# Patient Record
Sex: MALE | Race: BLACK OR AFRICAN AMERICAN | NOT HISPANIC OR LATINO | Employment: FULL TIME | URBAN - METROPOLITAN AREA
[De-identification: names, ages, dates, MRNs, and addresses within clinical notes are randomized per-mention and may not be internally consistent; named-entity substitution may affect disease eponyms.]

---

## 2017-10-14 ENCOUNTER — HOSPITAL ENCOUNTER (EMERGENCY)
Facility: OTHER | Age: 27
Discharge: HOME | End: 2017-10-14
Attending: EMERGENCY MEDICINE
Payer: MEDICAID

## 2017-10-14 VITALS
OXYGEN SATURATION: 98 % | WEIGHT: 145 LBS | HEIGHT: 68 IN | TEMPERATURE: 99 F | DIASTOLIC BLOOD PRESSURE: 84 MMHG | RESPIRATION RATE: 18 BRPM | HEART RATE: 70 BPM | SYSTOLIC BLOOD PRESSURE: 125 MMHG | BODY MASS INDEX: 21.98 KG/M2

## 2017-10-14 DIAGNOSIS — N30.01 ACUTE CYSTITIS WITH HEMATURIA: Primary | ICD-10-CM

## 2017-10-14 DIAGNOSIS — D64.9 ANEMIA, UNSPECIFIED TYPE: ICD-10-CM

## 2017-10-14 LAB
ALBUMIN SERPL BCP-MCNC: 3.9 G/DL
ALP SERPL-CCNC: 94 U/L
ALT SERPL W/O P-5'-P-CCNC: 18 U/L
ANION GAP SERPL CALC-SCNC: 7 MMOL/L
AST SERPL-CCNC: 32 U/L
BACTERIA #/AREA URNS HPF: ABNORMAL /HPF
BASOPHILS # BLD AUTO: 0.02 K/UL
BASOPHILS NFR BLD: 0.2 %
BILIRUB SERPL-MCNC: 0.3 MG/DL
BILIRUB UR QL STRIP: NEGATIVE
BUN SERPL-MCNC: 13 MG/DL
CALCIUM SERPL-MCNC: 10 MG/DL
CHLORIDE SERPL-SCNC: 102 MMOL/L
CLARITY UR: CLEAR
CO2 SERPL-SCNC: 29 MMOL/L
COLOR UR: YELLOW
CREAT SERPL-MCNC: 1 MG/DL
DIFFERENTIAL METHOD: ABNORMAL
EOSINOPHIL # BLD AUTO: 0.1 K/UL
EOSINOPHIL NFR BLD: 1.1 %
ERYTHROCYTE [DISTWIDTH] IN BLOOD BY AUTOMATED COUNT: 14.1 %
EST. GFR  (AFRICAN AMERICAN): >60 ML/MIN/1.73 M^2
EST. GFR  (NON AFRICAN AMERICAN): >60 ML/MIN/1.73 M^2
GLUCOSE SERPL-MCNC: 94 MG/DL
GLUCOSE UR QL STRIP: NEGATIVE
HCT VFR BLD AUTO: 39.3 %
HGB BLD-MCNC: 12.8 G/DL
HGB UR QL STRIP: ABNORMAL
KETONES UR QL STRIP: NEGATIVE
LEUKOCYTE ESTERASE UR QL STRIP: ABNORMAL
LIPASE SERPL-CCNC: 8 U/L
LYMPHOCYTES # BLD AUTO: 1.8 K/UL
LYMPHOCYTES NFR BLD: 14.4 %
MCH RBC QN AUTO: 28.3 PG
MCHC RBC AUTO-ENTMCNC: 32.6 G/DL
MCV RBC AUTO: 87 FL
MICROSCOPIC COMMENT: ABNORMAL
MONOCYTES # BLD AUTO: 0.7 K/UL
MONOCYTES NFR BLD: 5.3 %
NEUTROPHILS # BLD AUTO: 9.7 K/UL
NEUTROPHILS NFR BLD: 78.8 %
NITRITE UR QL STRIP: NEGATIVE
PH UR STRIP: 7 [PH] (ref 5–8)
PLATELET # BLD AUTO: 236 K/UL
PMV BLD AUTO: 10 FL
POTASSIUM SERPL-SCNC: 4.6 MMOL/L
PROT SERPL-MCNC: 8.1 G/DL
PROT UR QL STRIP: NEGATIVE
RBC # BLD AUTO: 4.52 M/UL
RBC #/AREA URNS HPF: 0 /HPF (ref 0–4)
SODIUM SERPL-SCNC: 138 MMOL/L
SP GR UR STRIP: 1.01 (ref 1–1.03)
URN SPEC COLLECT METH UR: ABNORMAL
UROBILINOGEN UR STRIP-ACNC: NEGATIVE EU/DL
WBC # BLD AUTO: 12.25 K/UL
WBC #/AREA URNS HPF: 15 /HPF (ref 0–5)
WBC CLUMPS URNS QL MICRO: ABNORMAL

## 2017-10-14 PROCEDURE — 63600175 PHARM REV CODE 636 W HCPCS: Performed by: EMERGENCY MEDICINE

## 2017-10-14 PROCEDURE — 25000003 PHARM REV CODE 250: Performed by: EMERGENCY MEDICINE

## 2017-10-14 PROCEDURE — 96374 THER/PROPH/DIAG INJ IV PUSH: CPT

## 2017-10-14 PROCEDURE — 25500020 PHARM REV CODE 255: Performed by: EMERGENCY MEDICINE

## 2017-10-14 PROCEDURE — 87591 N.GONORRHOEAE DNA AMP PROB: CPT

## 2017-10-14 PROCEDURE — 99284 EMERGENCY DEPT VISIT MOD MDM: CPT | Mod: 25

## 2017-10-14 PROCEDURE — 96372 THER/PROPH/DIAG INJ SC/IM: CPT

## 2017-10-14 PROCEDURE — 83690 ASSAY OF LIPASE: CPT

## 2017-10-14 PROCEDURE — 81000 URINALYSIS NONAUTO W/SCOPE: CPT

## 2017-10-14 PROCEDURE — 85025 COMPLETE CBC W/AUTO DIFF WBC: CPT

## 2017-10-14 PROCEDURE — 80053 COMPREHEN METABOLIC PANEL: CPT

## 2017-10-14 RX ORDER — CEFTRIAXONE 250 MG/1
250 INJECTION, POWDER, FOR SOLUTION INTRAMUSCULAR; INTRAVENOUS
Status: COMPLETED | OUTPATIENT
Start: 2017-10-14 | End: 2017-10-14

## 2017-10-14 RX ORDER — AZITHROMYCIN 250 MG/1
1000 TABLET, FILM COATED ORAL
Status: COMPLETED | OUTPATIENT
Start: 2017-10-14 | End: 2017-10-14

## 2017-10-14 RX ORDER — MORPHINE SULFATE 2 MG/ML
2 INJECTION, SOLUTION INTRAMUSCULAR; INTRAVENOUS
Status: COMPLETED | OUTPATIENT
Start: 2017-10-14 | End: 2017-10-14

## 2017-10-14 RX ORDER — AZITHROMYCIN 250 MG/1
1000 TABLET, FILM COATED ORAL
Status: DISCONTINUED | OUTPATIENT
Start: 2017-10-14 | End: 2017-10-14 | Stop reason: SDUPTHER

## 2017-10-14 RX ORDER — AZITHROMYCIN 100 MG/ML
1000 INJECTION, POWDER, LYOPHILIZED, FOR SOLUTION INTRAVENOUS
Status: DISCONTINUED | OUTPATIENT
Start: 2017-10-14 | End: 2017-10-14

## 2017-10-14 RX ADMIN — CEFTRIAXONE SODIUM 250 MG: 250 INJECTION, POWDER, FOR SOLUTION INTRAMUSCULAR; INTRAVENOUS at 11:10

## 2017-10-14 RX ADMIN — IOHEXOL 15 ML: 350 INJECTION, SOLUTION INTRAVENOUS at 08:10

## 2017-10-14 RX ADMIN — MORPHINE SULFATE 2 MG: 2 INJECTION, SOLUTION INTRAMUSCULAR; INTRAVENOUS at 08:10

## 2017-10-14 RX ADMIN — AZITHROMYCIN 1000 MG: 250 TABLET, FILM COATED ORAL at 11:10

## 2017-10-14 RX ADMIN — IOHEXOL 75 ML: 350 INJECTION, SOLUTION INTRAVENOUS at 09:10

## 2017-10-14 NOTE — ED NOTES
"Pt sleeping on bed bed w/ no active distress noted. Pt awakens easily to verbal stimuli. Pr denies active abdominal pains, cramping or discomfort at this time. Stating," It just comes and goes, it just eased up on me". Pt denies pains or needs at this time. Aware of current plan of care, awaiting results. Will continue to monitor.   "

## 2017-10-14 NOTE — ED TRIAGE NOTES
Pt presents to ED with c/o bilateral lower abdominal pain that started at 0200 this morning. Pt denies urinary symptoms, denies n/v/d. Pt AAO x4.

## 2017-10-14 NOTE — ED PROVIDER NOTES
"Encounter Date: 10/14/2017    SCRIBE #1 NOTE: I, Ga Spear, am scribing for, and in the presence of, Dr. Strange.       History     Chief Complaint   Patient presents with    Abdominal Pain     x 1 hr to the Q, no N/V/D, no dysuria     7:40 AM    Patient is a 27 y.o. male who presents to the ED with complaint of lower abdominal pain, which began about six hours ago. Pain is intermittent and is described as "cramping-aching." He denies constipation, diarrhea, nausea, vomiting, fever, chills, or penile discharge. He reports a normal BM since arriving to the ED. He has never had similar pain in the past. He reports no major medical problems, daily medications, or known allergies. He admits to use of tobacco, but denies use of alcohol or illicit drugs.        The history is provided by the patient.     Review of patient's allergies indicates:  No Known Allergies  Past Medical History:   Diagnosis Date    Asthma      History reviewed. No pertinent surgical history.  History reviewed. No pertinent family history.  Social History   Substance Use Topics    Smoking status: Current Every Day Smoker     Packs/day: 0.50     Types: Cigarettes    Smokeless tobacco: Never Used    Alcohol use No     Review of Systems   Constitutional: Negative for chills and fever.   HENT: Negative for sore throat.    Respiratory: Negative for shortness of breath.    Cardiovascular: Negative for chest pain.   Gastrointestinal: Positive for abdominal pain (lower). Negative for constipation, diarrhea, nausea and vomiting.   Genitourinary: Negative for discharge and dysuria.   Musculoskeletal: Negative for back pain.   Skin: Negative for rash.   Neurological: Negative for weakness.   Hematological: Does not bruise/bleed easily.       Physical Exam     Initial Vitals [10/14/17 0243]   BP Pulse Resp Temp SpO2   (!) 143/79 90 20 99 °F (37.2 °C) 99 %      MAP       100.33         Physical Exam    Nursing note and vitals " reviewed.  Constitutional: He appears well-developed and well-nourished. He is not diaphoretic. No distress.   HENT:   Head: Normocephalic and atraumatic.   Mouth/Throat: Oropharynx is clear and moist.   Eyes: Conjunctivae and EOM are normal. Pupils are equal, round, and reactive to light.   Conjunctiva are pink, clear, and intact.   Neck: Normal range of motion. Neck supple.   Cardiovascular: Normal rate, regular rhythm and normal heart sounds.   Normal S1, S2. No murmurs, no rubs, no gallops.    Pulmonary/Chest: Breath sounds normal. No respiratory distress. He has no wheezes. He has no rhonchi. He has no rales.   Clear to ascultation bilaterally.   Abdominal: Soft. There is tenderness (Periumbilical to RLQ tenderness to palpation).   No flank tenderness.   Musculoskeletal: Normal range of motion. He exhibits no edema.   Neurological: He is alert and oriented to person, place, and time. He has normal strength. No cranial nerve deficit.   Skin:   Warm and dry. No rashes, no lesions, or skin tenting.   Psychiatric: He has a normal mood and affect. His behavior is normal.         ED Course   Procedures  Labs Reviewed   CBC W/ AUTO DIFFERENTIAL - Abnormal; Notable for the following:        Result Value    RBC 4.52 (*)     Hemoglobin 12.8 (*)     Hematocrit 39.3 (*)     Gran # 9.7 (*)     Gran% 78.8 (*)     Lymph% 14.4 (*)     All other components within normal limits   COMPREHENSIVE METABOLIC PANEL - Abnormal; Notable for the following:     Anion Gap 7 (*)     All other components within normal limits   URINALYSIS - Abnormal; Notable for the following:     Occult Blood UA 1+ (*)     Leukocytes, UA 1+ (*)     All other components within normal limits   URINALYSIS MICROSCOPIC - Abnormal; Notable for the following:     WBC, UA 15 (*)     Bacteria, UA Many (*)     All other components within normal limits   C. TRACHOMATIS/N. GONORRHOEAE BY AMP DNA   C. TRACHOMATIS/N. GONORRHOEAE BY AMP DNA   LIPASE     Imaging Results           CT Abdomen Pelvis With Contrast (Final result)  Result time 10/14/17 09:58:31    Final result by Miguel A Baker MD (10/14/17 09:58:31)                 Impression:     No acute findings.      Electronically signed by: MIGUEL A BAKER MD  Date:     10/14/17  Time:    09:58              Narrative:    CT abdomen pelvis with contrast.    Findings: 75 mL of Omnipaque 350 IV contrast was administered for today's examination.    The visualized portion the base of the lungs, visualized portion of the heart, stomach, spleen, pancreas, liver, gallbladder, adrenal glands, visualized portion of the aorta, and kidneys have a normal appearance. The bladder is unremarkable. The bowel is unremarkable. The appendix has a normal appearance. The osseous structures are unremarkable.                                      Medical Decision Making:   Clinical Tests:   Lab Tests: Ordered and Reviewed  Radiological Study: Ordered and Reviewed            Scribe Attestation:   Scribe #1: I performed the above scribed service and the documentation accurately describes the services I performed. I attest to the accuracy of the note.    Attending Attestation:             Attending ED Notes:   Emergent evaluation a 27-year-old male with suprapubic abdominal pain.  No penile discharge.  No flank pain.  Patient is afebrile, nontoxic-appearing with stable vital signs.  No elevation of white blood cell count.  H&H 12.8 and 39.3.  No acute findings on CMP.  Lipase is 8.  Urinary analysis reveals many bacteria, 1+ leukocytes and 1+ blood.  No acute findings on CT scan.  The patient is extensively counseled on his diagnosis and treatment including all diagnostic, laboratory and physical exam findings.  The patient discharged good condition and directed follow-up with his PCP and urology in the next 24-48 hours.  I, Dr. Jed Putnam, personally performed the services described in this documentation. All medical record entries made by the scribe were at my  direction and in my presence.  I have reviewed the chart and agree that the record reflects my personal performance and is accurate and complete. Jed Cast MD.  6:33 PM 10/14/2017        ED Course      Clinical Impression:     1. Acute cystitis with hematuria    2. Anemia, unspecified type                                 Jed Cast MD  10/14/17 1830

## 2017-10-15 LAB
C TRACH DNA SPEC QL NAA+PROBE: NOT DETECTED
N GONORRHOEA DNA SPEC QL NAA+PROBE: NOT DETECTED

## 2018-04-19 ENCOUNTER — HOSPITAL ENCOUNTER (EMERGENCY)
Facility: OTHER | Age: 28
Discharge: HOME | End: 2018-04-20
Attending: EMERGENCY MEDICINE
Payer: MEDICAID

## 2018-04-19 DIAGNOSIS — S20.211A CONTUSION OF RIGHT CHEST WALL, INITIAL ENCOUNTER: ICD-10-CM
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
## 2018-04-19 DIAGNOSIS — S16.1XXA STRAIN OF NECK MUSCLE, INITIAL ENCOUNTER: ICD-10-CM

## 2018-04-19 DIAGNOSIS — R07.89 CHEST WALL PAIN: ICD-10-CM

## 2018-04-19 DIAGNOSIS — R07.9 CHEST PAIN: ICD-10-CM

## 2018-04-19 PROCEDURE — 25000003 PHARM REV CODE 250: Performed by: PHYSICIAN ASSISTANT

## 2018-04-19 PROCEDURE — 99284 EMERGENCY DEPT VISIT MOD MDM: CPT | Mod: 25

## 2018-04-19 PROCEDURE — 93010 ELECTROCARDIOGRAM REPORT: CPT | Mod: ,,, | Performed by: INTERNAL MEDICINE

## 2018-04-19 PROCEDURE — 93005 ELECTROCARDIOGRAM TRACING: CPT

## 2018-04-19 RX ORDER — IBUPROFEN 600 MG/1
600 TABLET ORAL
Status: COMPLETED | OUTPATIENT
Start: 2018-04-19 | End: 2018-04-19

## 2018-04-19 RX ADMIN — IBUPROFEN 600 MG: 600 TABLET, FILM COATED ORAL at 11:04

## 2018-04-20 VITALS
WEIGHT: 158.94 LBS | BODY MASS INDEX: 24.09 KG/M2 | RESPIRATION RATE: 16 BRPM | TEMPERATURE: 97 F | DIASTOLIC BLOOD PRESSURE: 88 MMHG | OXYGEN SATURATION: 100 % | HEIGHT: 68 IN | HEART RATE: 75 BPM | SYSTOLIC BLOOD PRESSURE: 168 MMHG

## 2018-04-20 RX ORDER — IBUPROFEN 600 MG/1
600 TABLET ORAL EVERY 6 HOURS PRN
Qty: 20 TABLET | Refills: 0 | OUTPATIENT
Start: 2018-04-20 | End: 2019-02-25
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
## 2019-02-25 ENCOUNTER — HOSPITAL ENCOUNTER (EMERGENCY)
Facility: OTHER | Age: 29
Discharge: HOME | End: 2019-02-25
Attending: EMERGENCY MEDICINE
Payer: MEDICAID

## 2019-02-25 VITALS
HEIGHT: 68 IN | BODY MASS INDEX: 26.07 KG/M2 | SYSTOLIC BLOOD PRESSURE: 172 MMHG | TEMPERATURE: 98 F | OXYGEN SATURATION: 99 % | DIASTOLIC BLOOD PRESSURE: 104 MMHG | HEART RATE: 75 BPM | WEIGHT: 172 LBS | RESPIRATION RATE: 17 BRPM

## 2019-02-25 DIAGNOSIS — S10.91XA ABRASION OF NECK, INITIAL ENCOUNTER: ICD-10-CM

## 2019-02-25 DIAGNOSIS — M62.838 CERVICAL PARASPINAL MUSCLE SPASM: Primary | ICD-10-CM

## 2019-02-25 DIAGNOSIS — S40.812A ABRASION OF LEFT UPPER EXTREMITY, INITIAL ENCOUNTER: ICD-10-CM

## 2019-02-25 DIAGNOSIS — S09.90XA MINOR HEAD INJURY WITHOUT LOSS OF CONSCIOUSNESS, INITIAL ENCOUNTER: ICD-10-CM

## 2019-02-25 PROCEDURE — 99284 EMERGENCY DEPT VISIT MOD MDM: CPT | Mod: 25

## 2019-02-25 PROCEDURE — 63600175 PHARM REV CODE 636 W HCPCS: Performed by: EMERGENCY MEDICINE

## 2019-02-25 PROCEDURE — 96372 THER/PROPH/DIAG INJ SC/IM: CPT

## 2019-02-25 RX ORDER — KETOROLAC TROMETHAMINE 30 MG/ML
30 INJECTION, SOLUTION INTRAMUSCULAR; INTRAVENOUS
Status: COMPLETED | OUTPATIENT
Start: 2019-02-25 | End: 2019-02-25

## 2019-02-25 RX ORDER — IBUPROFEN 600 MG/1
600 TABLET ORAL EVERY 6 HOURS PRN
Qty: 20 TABLET | Refills: 0 | Status: SHIPPED | OUTPATIENT
Start: 2019-02-25 | End: 2019-07-02 | Stop reason: ALTCHOICE

## 2019-02-25 RX ORDER — CYCLOBENZAPRINE HCL 10 MG
10 TABLET ORAL 3 TIMES DAILY PRN
Qty: 15 TABLET | Refills: 0 | Status: SHIPPED | OUTPATIENT
Start: 2019-02-25 | End: 2019-03-02

## 2019-02-25 RX ADMIN — KETOROLAC TROMETHAMINE 30 MG: 30 INJECTION, SOLUTION INTRAMUSCULAR; INTRAVENOUS at 02:02

## 2019-02-25 NOTE — ED PROVIDER NOTES
Encounter Date: 2/25/2019    SCRIBE #1 NOTE: I, Ignacio Martines, am scribing for, and in the presence of, Dr. Robertson .       History     Chief Complaint   Patient presents with    Neck Pain     Pt reports neck pain and left arm pain after a part of the ceiling collapsed on him while at work. No LOC     Time seen by provider: 2:13 PM    This is a 28 y.o. male who presents with complaint of posterior neck pain (R>L) s/p injury that occurred yesterday. Patient states he was standing and urinating in the bathroom when a ceiling tile collapsed and landed on his posterior neck. He denies head trauma, LOC, or back pain. He has applied a bag of ice and taken a hot shower with no pain relief. He has not been experiencing headaches, dizziness, vision changes, or numbness or tingling in the extremities. He denies significant past medical or surgical history. He has no additional complaints.       The history is provided by the patient.     Review of patient's allergies indicates:  No Known Allergies  Past Medical History:   Diagnosis Date    Anxiety     Asthma     Depression      No past surgical history on file.  No family history on file.  Social History     Tobacco Use    Smoking status: Current Every Day Smoker     Packs/day: 0.50     Types: Cigarettes    Smokeless tobacco: Never Used   Substance Use Topics    Alcohol use: No    Drug use: Yes     Types: Marijuana     Review of Systems   Constitutional: Negative for chills and fever.   HENT: Negative for congestion, rhinorrhea and sore throat.    Eyes: Negative for visual disturbance.   Respiratory: Negative for cough and shortness of breath.    Cardiovascular: Negative for chest pain.   Gastrointestinal: Negative for abdominal pain, diarrhea, nausea and vomiting.   Genitourinary: Negative for dysuria, frequency and urgency.   Musculoskeletal: Positive for neck pain (posterior (R>L)). Negative for back pain.   Skin: Negative for rash.   Neurological: Negative for  dizziness, syncope, numbness and headaches.        Negative for tingling.    Psychiatric/Behavioral: Negative for confusion.       Physical Exam     Initial Vitals [02/25/19 1339]   BP Pulse Resp Temp SpO2   (!) 152/86 82 18 97.5 °F (36.4 °C) 100 %      MAP       --         Physical Exam    Nursing note and vitals reviewed.  Constitutional: He appears well-developed and well-nourished. No distress.   HENT:   Head: Normocephalic and atraumatic.   Eyes: Conjunctivae and EOM are normal. Pupils are equal, round, and reactive to light.   Neck: Normal range of motion. Neck supple.   Paraspinal C spine tenderness bilaterally, worse on right. No focal midline tenderness.    Cardiovascular: Normal rate, regular rhythm, normal heart sounds and intact distal pulses.   Pulmonary/Chest: Breath sounds normal. No respiratory distress. He has no wheezes. He has no rhonchi. He has no rales.   Abdominal: Soft. He exhibits no distension. There is no tenderness.   Musculoskeletal: Normal range of motion.   Neurological: He is alert and oriented to person, place, and time. He has normal strength. No cranial nerve deficit.   Skin: Skin is warm and dry.   Superficial abrasion on left forearm. Superficial abrasion on posterior neck.    Psychiatric: He has a normal mood and affect. His behavior is normal. Judgment and thought content normal.         ED Course   Procedures  Labs Reviewed - No data to display       Imaging Results    None          Medical Decision Making:   Initial Assessment:   27 y/o M with trauma of posterior neck and forearm following ceiling tile falling yesterday.  Very low suspicion of bony injury given mechanism and exam.  Tiles struck the top of a bathroom stall prior to striking the patient.  Mild superficial abrasions noted and paraspinal muscle spasm and tenderness.  Will tx with nsaids, muscle relaxer, advise heat and rest.             Scribe Attestation:   Scribe #1: I performed the above scribed service and the  documentation accurately describes the services I performed. I attest to the accuracy of the note.    Attending Attestation:           Physician Attestation for Scribe:  Physician Attestation Statement for Scribe #1: I, Dr. Robertson, reviewed documentation, as scribed by Ignacio Martines  in my presence, and it is both accurate and complete.                    Clinical Impression:     1. Cervical paraspinal muscle spasm    2. Abrasion of neck, initial encounter    3. Abrasion of left upper extremity, initial encounter    4. Minor head injury without loss of consciousness, initial encounter                                   Sulema Robertson MD  02/26/19 0737

## 2019-02-25 NOTE — ED TRIAGE NOTES
Pt reports that while urinating yesterday ceiling tiles fell on him. Denies any LOC reports pain was worse this morning with pain radiating down both sides of neck and into back. Superficial abrasions to L FA.

## 2019-07-02 ENCOUNTER — HOSPITAL ENCOUNTER (EMERGENCY)
Facility: OTHER | Age: 29
Discharge: HOME | End: 2019-07-02
Attending: EMERGENCY MEDICINE
Payer: MEDICAID

## 2019-07-02 VITALS
WEIGHT: 145 LBS | OXYGEN SATURATION: 100 % | BODY MASS INDEX: 21.98 KG/M2 | HEART RATE: 102 BPM | HEIGHT: 68 IN | RESPIRATION RATE: 18 BRPM | DIASTOLIC BLOOD PRESSURE: 89 MMHG | TEMPERATURE: 98 F | SYSTOLIC BLOOD PRESSURE: 152 MMHG

## 2019-07-02 DIAGNOSIS — K04.7 CHRONIC DENTAL INFECTION: ICD-10-CM

## 2019-07-02 DIAGNOSIS — K02.9 DENTAL CARIES: ICD-10-CM

## 2019-07-02 DIAGNOSIS — L03.211 FACIAL CELLULITIS: Primary | ICD-10-CM

## 2019-07-02 LAB
ANION GAP SERPL CALC-SCNC: 11 MMOL/L (ref 8–16)
BASOPHILS # BLD AUTO: 0.01 K/UL (ref 0–0.2)
BASOPHILS NFR BLD: 0.1 % (ref 0–1.9)
BUN SERPL-MCNC: 12 MG/DL (ref 6–20)
CALCIUM SERPL-MCNC: 10.9 MG/DL (ref 8.7–10.5)
CHLORIDE SERPL-SCNC: 97 MMOL/L (ref 95–110)
CO2 SERPL-SCNC: 30 MMOL/L (ref 23–29)
CREAT SERPL-MCNC: 1.1 MG/DL (ref 0.5–1.4)
DIFFERENTIAL METHOD: ABNORMAL
EOSINOPHIL # BLD AUTO: 0 K/UL (ref 0–0.5)
EOSINOPHIL NFR BLD: 0.4 % (ref 0–8)
ERYTHROCYTE [DISTWIDTH] IN BLOOD BY AUTOMATED COUNT: 14.4 % (ref 11.5–14.5)
EST. GFR  (AFRICAN AMERICAN): >60 ML/MIN/1.73 M^2
EST. GFR  (NON AFRICAN AMERICAN): >60 ML/MIN/1.73 M^2
GLUCOSE SERPL-MCNC: 72 MG/DL (ref 70–110)
HCT VFR BLD AUTO: 43.9 % (ref 40–54)
HGB BLD-MCNC: 14.5 G/DL (ref 14–18)
LYMPHOCYTES # BLD AUTO: 2 K/UL (ref 1–4.8)
LYMPHOCYTES NFR BLD: 17.9 % (ref 18–48)
MCH RBC QN AUTO: 29.8 PG (ref 27–31)
MCHC RBC AUTO-ENTMCNC: 33 G/DL (ref 32–36)
MCV RBC AUTO: 90 FL (ref 82–98)
MONOCYTES # BLD AUTO: 1 K/UL (ref 0.3–1)
MONOCYTES NFR BLD: 8.8 % (ref 4–15)
NEUTROPHILS # BLD AUTO: 7.9 K/UL (ref 1.8–7.7)
NEUTROPHILS NFR BLD: 72.8 % (ref 38–73)
PLATELET # BLD AUTO: 211 K/UL (ref 150–350)
PMV BLD AUTO: 10 FL (ref 9.2–12.9)
POTASSIUM SERPL-SCNC: 4.5 MMOL/L (ref 3.5–5.1)
RBC # BLD AUTO: 4.87 M/UL (ref 4.6–6.2)
SODIUM SERPL-SCNC: 138 MMOL/L (ref 136–145)
WBC # BLD AUTO: 10.93 K/UL (ref 3.9–12.7)

## 2019-07-02 PROCEDURE — 99285 EMERGENCY DEPT VISIT HI MDM: CPT

## 2019-07-02 PROCEDURE — 85025 COMPLETE CBC W/AUTO DIFF WBC: CPT

## 2019-07-02 PROCEDURE — 25000003 PHARM REV CODE 250: Performed by: PHYSICIAN ASSISTANT

## 2019-07-02 PROCEDURE — 25500020 PHARM REV CODE 255: Performed by: EMERGENCY MEDICINE

## 2019-07-02 PROCEDURE — 80048 BASIC METABOLIC PNL TOTAL CA: CPT

## 2019-07-02 PROCEDURE — 96374 THER/PROPH/DIAG INJ IV PUSH: CPT

## 2019-07-02 PROCEDURE — 63600175 PHARM REV CODE 636 W HCPCS: Performed by: PHYSICIAN ASSISTANT

## 2019-07-02 RX ORDER — AMOXICILLIN AND CLAVULANATE POTASSIUM 875; 125 MG/1; MG/1
1 TABLET, FILM COATED ORAL 2 TIMES DAILY
Qty: 20 TABLET | Refills: 0 | Status: SHIPPED | OUTPATIENT
Start: 2019-07-02 | End: 2019-07-12

## 2019-07-02 RX ORDER — KETOROLAC TROMETHAMINE 30 MG/ML
15 INJECTION, SOLUTION INTRAMUSCULAR; INTRAVENOUS
Status: COMPLETED | OUTPATIENT
Start: 2019-07-02 | End: 2019-07-02

## 2019-07-02 RX ORDER — OXAPROZIN 600 MG/1
600 TABLET, FILM COATED ORAL 2 TIMES DAILY PRN
Qty: 20 TABLET | Refills: 0 | Status: SHIPPED | OUTPATIENT
Start: 2019-07-02 | End: 2023-05-10

## 2019-07-02 RX ORDER — AMOXICILLIN AND CLAVULANATE POTASSIUM 875; 125 MG/1; MG/1
1 TABLET, FILM COATED ORAL
Status: COMPLETED | OUTPATIENT
Start: 2019-07-02 | End: 2019-07-02

## 2019-07-02 RX ADMIN — KETOROLAC TROMETHAMINE 15 MG: 30 INJECTION, SOLUTION INTRAMUSCULAR; INTRAVENOUS at 10:07

## 2019-07-02 RX ADMIN — IOHEXOL 75 ML: 350 INJECTION, SOLUTION INTRAVENOUS at 12:07

## 2019-07-02 RX ADMIN — AMOXICILLIN AND CLAVULANATE POTASSIUM 1 TABLET: 875; 125 TABLET, FILM COATED ORAL at 01:07

## 2019-07-02 NOTE — ED PROVIDER NOTES
Encounter Date: 7/2/2019       History     Chief Complaint   Patient presents with    Facial Swelling     Pt c/o right sided face swelling, broken right upper tooth & subjective fever.     28-year-old male with history of asthma, anxiety and depression presents to the emergency department with complaints of right-sided facial swelling and dental pain. He also reports subjective fever.  He states that he has been having the pain for quite some time however noticed the swelling last night.  He states that he took ibuprofen last night for his fever.  He denies any nausea or vomiting.  He reports pain at a 10/10.    The history is provided by the patient.     Review of patient's allergies indicates:  No Known Allergies  Past Medical History:   Diagnosis Date    Anxiety     Asthma     Depression      History reviewed. No pertinent surgical history.  History reviewed. No pertinent family history.  Social History     Tobacco Use    Smoking status: Current Every Day Smoker     Packs/day: 0.50     Types: Cigarettes    Smokeless tobacco: Never Used   Substance Use Topics    Alcohol use: No    Drug use: Yes     Types: Marijuana     Review of Systems   Constitutional: Positive for fever.   HENT: Positive for dental problem and facial swelling. Negative for sore throat and trouble swallowing.    Respiratory: Negative for shortness of breath.    Cardiovascular: Negative for chest pain.   Gastrointestinal: Negative for nausea and vomiting.   Musculoskeletal: Negative for back pain.   Skin: Negative for rash.   Neurological: Negative for weakness.   Hematological: Does not bruise/bleed easily.       Physical Exam     Initial Vitals [07/02/19 1015]   BP Pulse Resp Temp SpO2   (!) 133/92 102 18 98.7 °F (37.1 °C) 99 %      MAP       --         Physical Exam    Nursing note and vitals reviewed.  Constitutional: He appears well-developed and well-nourished. He is not diaphoretic.  Non-toxic appearance. No distress.   HENT:    Head: Normocephalic and atraumatic.       Right Ear: External ear normal.   Left Ear: External ear normal.   Nose: Nose normal.   Mouth/Throat: Uvula is midline, oropharynx is clear and moist and mucous membranes are normal. Mucous membranes are not pale and not dry. No trismus in the jaw. Abnormal dentition. Dental caries present. No uvula swelling. No oropharyngeal exudate, posterior oropharyngeal edema, posterior oropharyngeal erythema or tonsillar abscesses.   Multiple decayed teeth with poor dentition.  Gingival inflammation without palpable drainable abscess.  Concerns for underlying dental abscess.   Eyes: Conjunctivae, EOM and lids are normal. Pupils are equal, round, and reactive to light. No scleral icterus.   Neck: Normal range of motion and phonation normal. Neck supple.   Cardiovascular: Normal rate, regular rhythm and normal heart sounds. Exam reveals no gallop and no friction rub.    No murmur heard.  Pulmonary/Chest: Effort normal and breath sounds normal. No respiratory distress. He has no decreased breath sounds. He has no wheezes. He has no rhonchi. He has no rales.   Abdominal: Normal appearance.   Musculoskeletal: Normal range of motion.   No obvious deformities, moving all extremities, normal gait   Neurological: He is alert and oriented to person, place, and time. No sensory deficit.   Skin: Skin is warm, dry and intact. No lesion and no rash noted. No erythema.   Psychiatric: He has a normal mood and affect. His speech is normal and behavior is normal. Judgment normal. Cognition and memory are normal.         ED Course   Procedures  Labs Reviewed   CBC W/ AUTO DIFFERENTIAL - Abnormal; Notable for the following components:       Result Value    Gran # (ANC) 7.9 (*)     Lymph% 17.9 (*)     All other components within normal limits   BASIC METABOLIC PANEL - Abnormal; Notable for the following components:    CO2 30 (*)     Calcium 10.9 (*)     All other components within normal limits           Imaging Results          CT Maxillofacial With Contrast (Final result)  Result time 07/02/19 13:20:39    Final result by Nikko Vivas III, MD (07/02/19 13:20:39)                 Impression:      Right facial cellulitis with no evidence of abscess.      Electronically signed by: Nikko Vivas MD  Date:    07/02/2019  Time:    13:20             Narrative:    EXAMINATION:  CT MAXILLOFACIAL WITH CONTRAST    CLINICAL HISTORY:  Mass, lump or swelling, maxface;facial swelling r/o abscess;    FINDINGS:  Patient was administered 75 cc of Omnipaque 350.  There is soft tissue swelling of the face right greater than left.  Orbits are unremarkable.  Intracranial structures show nothing unusual.  Preseptal space is unremarkable.  Swelling primarily involves the right side of the face below the orbit and is consistent with cellulitis right greater than left.  No definite abscess collection is seen at this time.  There is very mild right maxillary sinusitis change.  Osseous structures show nothing unusual.                                 Medical Decision Making:   History:   Old Medical Records: I decided to obtain old medical records.  Initial Assessment:   28-year-old male with complaints consistent facial cellulitis and dental caries concerning for underlying dental infection.  Vital signs stable, afebrile, neurovascularly intact.  He is alert, healthy and nontoxic appearing.  He is not distressed.  On exam he has facial swelling with dental pain and multiple decayed teeth.  No drainable abscess however there is some gingival inflammation concerning for underlying abscess.  Clinical Tests:   Lab Tests: Reviewed and Ordered  Radiological Study: Ordered and Reviewed  ED Management:  Basic labs as well as CT of the face with IV contrast was obtained. No elevation in white blood cell count H&H stable. Patient has facial cellulitis seen on CT without evidence of abscess.  He was administered Toradol and Augmentin in the  emergency department will be discharged home with Augmentin oxaprozin as well as care instructions.  He is urged to follow up with dental clinic at 1st available appointment or return to the emergency department for any worsening signs or symptoms. He states understanding agrees with this plan.  This is the extent of patient's complaints today.  This patient was discussed with the attending physician who agrees with treatment plan.  Other:   I have discussed this case with another health care provider.       <> Summary of the Discussion: Steven  This note was created using [employer] dictation.  There may be typographical errors secondary to dictation.                        Clinical Impression:     1. Facial cellulitis    2. Dental caries    3. Chronic dental infection            Disposition:   Disposition: Discharged  Condition: Stable                        Jessica Dunn PA-C  07/02/19 0194

## 2022-04-11 ENCOUNTER — HOSPITAL ENCOUNTER (EMERGENCY)
Facility: OTHER | Age: 32
Discharge: HOME | End: 2022-04-11
Attending: EMERGENCY MEDICINE
Payer: MEDICAID

## 2022-04-11 VITALS
HEIGHT: 67 IN | WEIGHT: 154 LBS | SYSTOLIC BLOOD PRESSURE: 157 MMHG | OXYGEN SATURATION: 100 % | RESPIRATION RATE: 16 BRPM | BODY MASS INDEX: 24.17 KG/M2 | TEMPERATURE: 98 F | DIASTOLIC BLOOD PRESSURE: 108 MMHG | HEART RATE: 79 BPM

## 2022-04-11 DIAGNOSIS — S09.93XA DENTAL TRAUMA, INITIAL ENCOUNTER: ICD-10-CM

## 2022-04-11 DIAGNOSIS — S00.83XA FACIAL CONTUSION, INITIAL ENCOUNTER: Primary | ICD-10-CM
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
## 2022-04-11 PROCEDURE — 99284 EMERGENCY DEPT VISIT MOD MDM: CPT | Mod: 25

## 2022-04-11 PROCEDURE — 25000003 PHARM REV CODE 250: Performed by: EMERGENCY MEDICINE

## 2022-04-11 RX ORDER — HYDROCODONE BITARTRATE AND ACETAMINOPHEN 5; 325 MG/1; MG/1
1 TABLET ORAL EVERY 4 HOURS PRN
Qty: 12 TABLET | Refills: 0 | OUTPATIENT
Start: 2022-04-11 | End: 2022-08-07

## 2022-04-11 RX ORDER — ONDANSETRON 4 MG/1
4 TABLET, ORALLY DISINTEGRATING ORAL EVERY 8 HOURS PRN
Qty: 30 TABLET | Refills: 0 | OUTPATIENT
Start: 2022-04-11 | End: 2022-07-25

## 2022-04-11 RX ORDER — IBUPROFEN 600 MG/1
600 TABLET ORAL EVERY 6 HOURS PRN
Qty: 20 TABLET | Refills: 0 | OUTPATIENT
Start: 2022-04-11 | End: 2022-08-07

## 2022-04-11 RX ORDER — IBUPROFEN 600 MG/1
600 TABLET ORAL
Status: COMPLETED | OUTPATIENT
Start: 2022-04-11 | End: 2022-04-11

## 2022-04-11 RX ORDER — HYDROCODONE BITARTRATE AND ACETAMINOPHEN 5; 325 MG/1; MG/1
1 TABLET ORAL
Status: COMPLETED | OUTPATIENT
Start: 2022-04-11 | End: 2022-04-11

## 2022-04-11 RX ADMIN — IBUPROFEN 600 MG: 600 TABLET ORAL at 12:04

## 2022-04-11 RX ADMIN — HYDROCODONE BITARTRATE AND ACETAMINOPHEN 1 TABLET: 5; 325 TABLET ORAL at 12:04
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
## 2022-04-11 NOTE — Clinical Note
"Victorino Fisher (Kevin) was seen and treated in our emergency department on 4/11/2022.  He may return to work on 04/14/2022.       If you have any questions or concerns, please don't hesitate to call.      Germán Dubois RN    "
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
## 2022-07-25 ENCOUNTER — HOSPITAL ENCOUNTER (EMERGENCY)
Facility: OTHER | Age: 32
Discharge: HOME | End: 2022-07-25
Attending: EMERGENCY MEDICINE
Payer: MEDICAID

## 2022-07-25 VITALS
HEART RATE: 69 BPM | BODY MASS INDEX: 25.01 KG/M2 | RESPIRATION RATE: 18 BRPM | OXYGEN SATURATION: 100 % | SYSTOLIC BLOOD PRESSURE: 154 MMHG | DIASTOLIC BLOOD PRESSURE: 97 MMHG | TEMPERATURE: 98 F | HEIGHT: 68 IN | WEIGHT: 165 LBS

## 2022-07-25 DIAGNOSIS — R19.7 NAUSEA VOMITING AND DIARRHEA: Primary | ICD-10-CM

## 2022-07-25 DIAGNOSIS — R11.2 NAUSEA VOMITING AND DIARRHEA: Primary | ICD-10-CM

## 2022-07-25 PROCEDURE — 25000003 PHARM REV CODE 250: Performed by: EMERGENCY MEDICINE

## 2022-07-25 PROCEDURE — 99284 EMERGENCY DEPT VISIT MOD MDM: CPT

## 2022-07-25 RX ORDER — LOPERAMIDE HYDROCHLORIDE 2 MG/1
2 CAPSULE ORAL 4 TIMES DAILY PRN
Qty: 12 CAPSULE | Refills: 0 | Status: SHIPPED | OUTPATIENT
Start: 2022-07-25 | End: 2022-08-04

## 2022-07-25 RX ORDER — ONDANSETRON 4 MG/1
4 TABLET, ORALLY DISINTEGRATING ORAL
Status: COMPLETED | OUTPATIENT
Start: 2022-07-25 | End: 2022-07-25

## 2022-07-25 RX ORDER — ONDANSETRON 4 MG/1
4 TABLET, ORALLY DISINTEGRATING ORAL EVERY 6 HOURS PRN
Qty: 12 TABLET | Refills: 0 | Status: SHIPPED | OUTPATIENT
Start: 2022-07-25 | End: 2023-08-11 | Stop reason: CLARIF

## 2022-07-25 RX ADMIN — ONDANSETRON 4 MG: 4 TABLET, ORALLY DISINTEGRATING ORAL at 09:07

## 2022-07-25 NOTE — ED TRIAGE NOTES
Patient presents to ED with c/o N/V/D and epigastric burning/cramping x 2 days. Denies fever, urinary complaints or abdominal pain.

## 2022-07-25 NOTE — ED PROVIDER NOTES
"Encounter Date: 7/25/2022    SCRIBE #1 NOTE: I, Kiana Mayo, am scribing for, and in the presence of, Yusra Flores MD.       History     Chief Complaint   Patient presents with    Abdominal Pain    Vomiting     Pt c.o mid abd pain with vomiting onset yesterday. Pt also c.o headache. AAO x 3 nadn skin w.d  vomit x 3 yesterday.  Mucus membrane m/p  pt states bm was loose x 2       Headache     Time seen by provider: 8:27 AM    This is a 32 y.o. male with PMHx of asthma who presents with complaint of abdominal pain which is now resolved. He reports trying crawfish pasta at work 2 days ago for the first time and that evening feeling fever and sweats. He describes waking up the next day and going to work when he began to experiencing nausea, intermittent abdominal pain, two episodes of diarrhea and three episodes of vomiting. He describes his abdominal pain as a "cramping".  He notes all his symptoms are resolved as of 9 PM last night.The patient denies shortness of breath, chest pain, hematemesis, blood in stool, alcohol or drug use. This is the extent of the patient's complaints at this time.    The history is provided by the patient.     Review of patient's allergies indicates:  No Known Allergies  Past Medical History:   Diagnosis Date    Anxiety     Asthma     Depression      History reviewed. No pertinent surgical history.  History reviewed. No pertinent family history.  Social History     Tobacco Use    Smoking status: Current Every Day Smoker     Packs/day: 0.50     Types: Cigarettes    Smokeless tobacco: Never Used   Substance Use Topics    Alcohol use: No    Drug use: Yes     Types: Marijuana     Review of Systems   Constitutional: Positive for diaphoresis (resolved) and fever (resolved). Negative for chills.   HENT: Negative for congestion and sore throat.    Eyes: Negative for visual disturbance.   Respiratory: Negative for cough and shortness of breath.    Cardiovascular: Negative for chest " pain and palpitations.   Gastrointestinal: Positive for abdominal pain (resolved), diarrhea (resolved), nausea (resolved) and vomiting (resolved). Negative for blood in stool.        Negative for hematemesis.   Genitourinary: Negative for decreased urine volume, dysuria and frequency.   Musculoskeletal: Negative for joint swelling, neck pain and neck stiffness.   Skin: Negative for rash and wound.   Neurological: Negative for weakness, numbness and headaches.   Psychiatric/Behavioral: Negative for behavioral problems and confusion.       Physical Exam     Initial Vitals [07/25/22 0811]   BP Pulse Resp Temp SpO2   (!) 147/98 81 18 98.1 °F (36.7 °C) 98 %      MAP       --         Physical Exam    Nursing note and vitals reviewed.  Constitutional: He appears well-developed and well-nourished. He is cooperative.   HENT:   Head: Normocephalic and atraumatic.   Mouth/Throat: Uvula is midline and mucous membranes are normal. No oropharyngeal exudate.   Eyes: Conjunctivae and EOM are normal. Pupils are equal, round, and reactive to light.   Neck: Neck supple.   Cardiovascular: Normal rate, regular rhythm and normal heart sounds.   Pulmonary/Chest: Breath sounds normal. He has no wheezes. He has no rhonchi. He has no rales.   Abdominal: Abdomen is soft. There is no abdominal tenderness. There is no rebound and no guarding.   Musculoskeletal:      Cervical back: Neck supple.     Neurological: He is alert and oriented to person, place, and time. He has normal strength. No cranial nerve deficit or sensory deficit.   Skin: Skin is warm and dry. Capillary refill takes less than 2 seconds. No rash noted.   Psychiatric: He has a normal mood and affect. His speech is normal.         ED Course   Procedures  Labs Reviewed - No data to display       Imaging Results    None          Medications   ondansetron disintegrating tablet 4 mg (4 mg Oral Given 7/25/22 0902)     Medical Decision Making:   History:   Old Medical Records: I decided  to obtain old medical records.  ED Management:  Emergent evaluation of 32-year-old male who presents with complaint of nausea vomiting diarrhea and abdominal cramping which is now resolved.  Patient is tolerating p.o..  Vital signs are benign, afebrile.  Physical exam is benign.  He tolerated p.o. challenge after oral Zofran.  He is discharged in good condition with prescription for Zofran and Imodium p.r.n..  He is given strict return precautions and advised to follow-up with his PCP if symptoms persist.  He is given a work note.  Etiology may be gastroenteritis versus food poisoning.  I do not suspect a surgical cause at this time.          Scribe Attestation:   Scribe #1: I performed the above scribed service and the documentation accurately describes the services I performed. I attest to the accuracy of the note.               Physician Attestation for Scribe: I, Yusra Flores, reviewed documentation as scribed in my presence, which is both accurate and complete.  Clinical Impression:   Final diagnoses:  [R11.2, R19.7] Nausea vomiting and diarrhea (Primary)          ED Disposition Condition    Discharge Stable        ED Prescriptions     Medication Sig Dispense Start Date End Date Auth. Provider    ondansetron (ZOFRAN-ODT) 4 MG TbDL Take 1 tablet (4 mg total) by mouth every 6 (six) hours as needed (nausea). 12 tablet 7/25/2022  Yusra Flores MD    loperamide (IMODIUM) 2 mg capsule Take 1 capsule (2 mg total) by mouth 4 (four) times daily as needed for Diarrhea. 12 capsule 7/25/2022 8/4/2022 Yusra Flores MD        Follow-up Information     Follow up With Specialties Details Why Contact Info    Maricarmen  Schedule an appointment as soon as possible for a visit  for close follow up and symptom re-check. Or with your primary doctor if you already have one established. [address]  [place] [address]  [phone]      [facility] - Emergency Dept Emergency Medicine  As needed, If  symptoms worsen [address]  [facility] [address]  [phone]           Yusra Flores MD  07/25/22 9875

## 2022-07-25 NOTE — Clinical Note
"Victorino Velazquezin" Phillip was seen and treated in our emergency department on 7/25/2022.  He may return to work on 07/26/2022.       If you have any questions or concerns, please don't hesitate to call.      Yusra Flores MD"

## 2022-08-07 ENCOUNTER — HOSPITAL ENCOUNTER (EMERGENCY)
Facility: HOSPITAL | Age: 32
Discharge: HOME | End: 2022-08-07
Attending: EMERGENCY MEDICINE
Payer: MEDICAID

## 2022-08-07 VITALS
RESPIRATION RATE: 18 BRPM | DIASTOLIC BLOOD PRESSURE: 106 MMHG | HEART RATE: 99 BPM | SYSTOLIC BLOOD PRESSURE: 159 MMHG | OXYGEN SATURATION: 100 % | HEIGHT: 68 IN | BODY MASS INDEX: 24.25 KG/M2 | TEMPERATURE: 98 F | WEIGHT: 160 LBS

## 2022-08-07 DIAGNOSIS — M25.552 LEFT HIP PAIN: ICD-10-CM

## 2022-08-07 DIAGNOSIS — M54.9 BACK PAIN, UNSPECIFIED BACK LOCATION, UNSPECIFIED BACK PAIN LATERALITY, UNSPECIFIED CHRONICITY: Primary | ICD-10-CM

## 2022-08-07 PROCEDURE — 99284 PR EMERGENCY DEPT VISIT,LEVEL IV: ICD-10-PCS | Mod: ,,, | Performed by: EMERGENCY MEDICINE

## 2022-08-07 PROCEDURE — 99284 EMERGENCY DEPT VISIT MOD MDM: CPT | Mod: 25

## 2022-08-07 PROCEDURE — 99284 EMERGENCY DEPT VISIT MOD MDM: CPT | Mod: ,,, | Performed by: EMERGENCY MEDICINE

## 2022-08-07 PROCEDURE — 25000003 PHARM REV CODE 250

## 2022-08-07 RX ORDER — IBUPROFEN 600 MG/1
600 TABLET ORAL
Status: COMPLETED | OUTPATIENT
Start: 2022-08-07 | End: 2022-08-07

## 2022-08-07 RX ORDER — HYDROCODONE BITARTRATE AND ACETAMINOPHEN 10; 325 MG/1; MG/1
1 TABLET ORAL EVERY 6 HOURS PRN
Qty: 12 TABLET | Refills: 0 | Status: SHIPPED | OUTPATIENT
Start: 2022-08-07 | End: 2022-08-10

## 2022-08-07 RX ORDER — IBUPROFEN 600 MG/1
600 TABLET ORAL EVERY 6 HOURS PRN
Qty: 20 TABLET | Refills: 0 | Status: SHIPPED | OUTPATIENT
Start: 2022-08-07 | End: 2022-08-12

## 2022-08-07 RX ADMIN — IBUPROFEN 600 MG: 600 TABLET, FILM COATED ORAL at 03:08

## 2022-08-07 NOTE — DISCHARGE INSTRUCTIONS
Diagnosis: Back pain    X-Ray Hips Bilateral 2 View Inc AP Pelvis   Final Result      No radiographic evidence of acute displaced fracture or dislocation.         Electronically signed by: Jed Ramirez MD   Date:    08/07/2022   Time:    04:39      CT Thoracic Spine Without Contrast   Final Result      No CT evidence of acute fracture or traumatic subluxation of the thoracolumbar spine.         Electronically signed by: Jed Ramirez MD   Date:    08/07/2022   Time:    04:37      CT Lumbar Spine Without Contrast   Final Result      No CT evidence of acute fracture or traumatic subluxation of the thoracolumbar spine.         Electronically signed by: Jed Ramirez MD   Date:    08/07/2022   Time:    04:37            Take ibuprofen (also called Advil, Motrin) for your pain. This medicine is available over-the-counter in 200 mg tablets.  - You may take 600 mg every 6 hours, or 800 mg every 8 hours as needed   - Do not take more than this amount. Too much ibuprofen can cause kidney problems, bleeding in your stomach, and other serious problems.   - Do not also take naproxen (Aleve) at the same time or on the same day  - If you have heart problems or uncontrolled high blood pressure, you should not take ibuprofen for more than 3 days without discussing with your doctor    Follow-up plan:  · Follow-up with: Primary care doctor within 3 - 5  days  · Follow-up for additional testing and/or evaluation as directed by your primary doctor    Return to the emergency department for reasons including:   - Severe pain not controlled by the pain medicine listed above   - You cannot walk because of pain or weakness  - Fevers (>100.4°F)  - Loss of bowel or bladder control (dribbling of urine, urinating or pooping on self, or having accidents you wouldn't normally have)  - Not able to urinate  - Numbness of your genital or anal area  - New or worsening weakness or numbness of your arms or legs  - Shortness of breath or chest  pain, vomiting with inability to hold down fluids, passing out/unconsciousness, or any other concerning symptoms.

## 2022-08-07 NOTE — Clinical Note
"Victorino Fisher (Kevin) was seen and treated in our emergency department on 8/7/2022.  He may return to work on 08/10/2022.       If you have any questions or concerns, please don't hesitate to call.      Edilia Rueda MD"

## 2022-08-07 NOTE — ED TRIAGE NOTES
Pt. Slipped and fell at work.  Pt. Reports that he is having lower back pain that has become worse through the night.  Able to walk without assistance.  No other s/s or complaints.    APPEARANCE: No acute distress.    NEURO: Awake, alert, appropriate for age  HEENT: Head symmetrical. No obvious deformity  RESPIRATORY: Airway is open and patent. Respirations are spontaneous on room air.   NEUROVASCULAR: All extremities are warm and pink with capillary refill less than 3 seconds.   MUSCULOSKELETAL: Moves all extremities, wiggling toes and moving hands.   SKIN: Warm and dry, adequate turgor, mucus membranes moist and pink    Will continue to monitor.

## 2022-08-07 NOTE — ED PROVIDER NOTES
Encounter Date: 8/7/2022       History     Chief Complaint   Patient presents with    Fall     Slip and fall on wet floor at work, c/o increasing LBP     32-year-old male with past medical history of asthma presents to the ED complaining of low back pain s/p mechanical slip and fall while carrying a heavy the alcohol case down stairs at 11:30 PM last night.  He states he was at work while this occurred.  He reports the pain is a 7/10 terms of intensity and states that is sharp in nature.  Patient denies head trauma, loss consciousness or any other symptoms at this time.    The history is provided by the patient.     Review of patient's allergies indicates:  No Known Allergies  Past Medical History:   Diagnosis Date    Anxiety     Asthma     Depression      History reviewed. No pertinent surgical history.  History reviewed. No pertinent family history.  Social History     Tobacco Use    Smoking status: Current Every Day Smoker     Packs/day: 0.50     Types: Cigarettes    Smokeless tobacco: Never Used   Substance Use Topics    Alcohol use: No    Drug use: Yes     Types: Marijuana     Review of Systems   Constitutional: Negative for activity change, chills and fever.   HENT: Negative for congestion, ear pain and sore throat.    Respiratory: Negative for shortness of breath and stridor.    Cardiovascular: Negative for chest pain and palpitations.   Gastrointestinal: Negative for abdominal pain, nausea and vomiting.   Genitourinary: Negative for dysuria and urgency.   Musculoskeletal: Positive for back pain (Low back pain).   Skin: Negative for rash.   Neurological: Negative for dizziness, syncope, weakness and headaches.   Hematological: Does not bruise/bleed easily.       Physical Exam     Initial Vitals [08/07/22 0147]   BP Pulse Resp Temp SpO2   (!) 159/106 99 18 98.4 °F (36.9 °C) 100 %      MAP       --         Physical Exam    Nursing note and vitals reviewed.  Constitutional: Vital signs are normal. He  appears well-developed and well-nourished. He is not diaphoretic. No distress.   HENT:   Head: Normocephalic and atraumatic.   Right Ear: External ear normal.   Left Ear: External ear normal.   Eyes: EOM are normal. Right eye exhibits no discharge. Left eye exhibits no discharge.   Neck: Trachea normal. Neck supple. No thyroid mass present.   Normal range of motion.  Cardiovascular: Normal rate, regular rhythm, normal heart sounds and intact distal pulses. Exam reveals no gallop and no friction rub.    No murmur heard.  Pulmonary/Chest: Breath sounds normal. No respiratory distress. He has no wheezes. He has no rhonchi. He has no rales.   Abdominal: Abdomen is soft. Bowel sounds are normal. He exhibits no distension. There is no abdominal tenderness. There is no rebound and no guarding.   Musculoskeletal:         General: Tenderness present.      Cervical back: Normal range of motion and neck supple.      Comments: No C-spine tenderness to palpation.    Midline T-spine and L-spine tenderness to palpation.    Left hip tenderness to palpation.     Neurological: He is alert and oriented to person, place, and time. He has normal strength. No cranial nerve deficit or sensory deficit. GCS score is 15. GCS eye subscore is 4. GCS verbal subscore is 5. GCS motor subscore is 6.   Skin: Skin is warm and dry. Capillary refill takes less than 2 seconds. No rash noted.   Psychiatric: He has a normal mood and affect.         ED Course   Procedures  Labs Reviewed - No data to display       Imaging Results          X-Ray Hips Bilateral 2 View Inc AP Pelvis (Final result)  Result time 08/07/22 04:39:22   Procedure changed from X-Ray Hip 2 or 3 views Left (with Pelvis when performed)     Final result by Jed Ramirez MD (08/07/22 04:39:22)                 Impression:      No radiographic evidence of acute displaced fracture or dislocation.      Electronically signed by: Jed Ramirez MD  Date:    08/07/2022  Time:    04:39              Narrative:    EXAMINATION:  XR HIPS BILATERAL 2 VIEW INCL AP PELVIS    CLINICAL HISTORY:  fall;  Pain in left hip    TECHNIQUE:  AP view of the pelvis and frogleg lateral views of both hips were performed.    COMPARISON:  None.    FINDINGS:  There is no radiographic evidence of acute displaced fracture of dislocation.  The bilateral femoral heads appear appropriately seated within the acetabula.  The ilioischial and iliopectineal lines appear maintained.  There is no pubic symphyseal or sacroiliac joint diastasis.  The sacrum appears grossly intact.                               CT Thoracic Spine Without Contrast (Final result)  Result time 08/07/22 04:37:44    Final result by Jed Ramirez MD (08/07/22 04:37:44)                 Impression:      No CT evidence of acute fracture or traumatic subluxation of the thoracolumbar spine.      Electronically signed by: Jed Ramirez MD  Date:    08/07/2022  Time:    04:37             Narrative:    EXAMINATION:  CT LUMBAR SPINE WITHOUT CONTRAST; CT THORACIC SPINE WITHOUT CONTRAST    CLINICAL HISTORY:  Low back pain, trauma;Back trauma, no prior imaging (Age >= 16y);; Back trauma, no prior imaging (Age >= 16y);    TECHNIQUE:  Low-dose axial, sagittal and coronal reformations are obtained through the thoracic and lumbar spine.  Contrast was not administered.    COMPARISON:  None.    FINDINGS:  Thoracic and lumbar spine alignment appears within normal limits.  The thoracolumbar vertebral body heights are adequately maintained.  There is no evidence of acute displaced fracture throughout the thoracic or lumbar spine.  Intervertebral disc space heights are well maintained throughout the thoracic and lumbar spine.  There is no evidence of significant bony spinal canal stenosis or neural foraminal narrowing.  Bilateral sacroiliac joints appear well maintained.  The sacrum is intact.    Visualized intrathoracic structures demonstrate no confluent airspace  consolidation or pleural fluid.  Limited views of the posterior abdominal structures demonstrate no acute abnormalities.                               CT Lumbar Spine Without Contrast (Final result)  Result time 08/07/22 04:37:44    Final result by Jed Ramirez MD (08/07/22 04:37:44)                 Impression:      No CT evidence of acute fracture or traumatic subluxation of the thoracolumbar spine.      Electronically signed by: Jed Ramirez MD  Date:    08/07/2022  Time:    04:37             Narrative:    EXAMINATION:  CT LUMBAR SPINE WITHOUT CONTRAST; CT THORACIC SPINE WITHOUT CONTRAST    CLINICAL HISTORY:  Low back pain, trauma;Back trauma, no prior imaging (Age >= 16y);; Back trauma, no prior imaging (Age >= 16y);    TECHNIQUE:  Low-dose axial, sagittal and coronal reformations are obtained through the thoracic and lumbar spine.  Contrast was not administered.    COMPARISON:  None.    FINDINGS:  Thoracic and lumbar spine alignment appears within normal limits.  The thoracolumbar vertebral body heights are adequately maintained.  There is no evidence of acute displaced fracture throughout the thoracic or lumbar spine.  Intervertebral disc space heights are well maintained throughout the thoracic and lumbar spine.  There is no evidence of significant bony spinal canal stenosis or neural foraminal narrowing.  Bilateral sacroiliac joints appear well maintained.  The sacrum is intact.    Visualized intrathoracic structures demonstrate no confluent airspace consolidation or pleural fluid.  Limited views of the posterior abdominal structures demonstrate no acute abnormalities.                                 Medications   ibuprofen tablet 600 mg (600 mg Oral Given 8/7/22 0335)     Medical Decision Making:   Initial Assessment:   32-year-old male who appears in no acute distress presents the ED complaining of lower back pain.  ABC's intact.  Physical exam reveals a midline T and L-spine tenderness to  palpation as well as left hip tenderness to palpation.  Differential Diagnosis:   Dislocation versus fracture versus sprain versus contusion  ED Management:  CT T-spine and CT L-spine are unremarkable and not show any acute fracture of the spine.  X-ray of the bilateral hips are unremarkable.  Will explain the results to the patient and discharge with a prescription for Norco and ibuprofen.  Patient understands and agrees with plan.  Will discharge the patient.            Attending Attestation:   Physician Attestation Statement for Resident:  As the supervising MD   Physician Attestation Statement: I have personally seen and examined this patient.   I agree with the above history. -:   As the supervising MD I agree with the above PE.    As the supervising MD I agree with the above treatment, course, plan, and disposition.   -:   Patient reassessed after pain medication, pain is improved.  Able to ambulate without issue.  No neuro deficit.  Discharged home in stable condition.  Return precautions discussed at bedside.  I have reviewed and agree with the residents interpretation of the following: lab data and CT scans.  I have reviewed the following: old records at this facility.                         Clinical Impression:   Final diagnoses:  [M25.552] Left hip pain  [M54.9] Back pain, unspecified back location, unspecified back pain laterality, unspecified chronicity (Primary)          ED Disposition Condition    Discharge Stable        ED Prescriptions     Medication Sig Dispense Start Date End Date Auth. Provider    ibuprofen (ADVIL,MOTRIN) 600 MG tablet Take 1 tablet (600 mg total) by mouth every 6 (six) hours as needed for Pain. 20 tablet 8/7/2022 8/12/2022 Edilia Rueda MD    HYDROcodone-acetaminophen (NORCO)  mg per tablet Take 1 tablet by mouth every 6 (six) hours as needed for Pain. 12 tablet 8/7/2022 8/10/2022 Edilia Rueda MD        Follow-up Information     Follow up With Specialties  Details Why Contact Info    Your primary care doctor               Khadra Ibarra MD  Resident  08/07/22 6683       Edilia Rueda MD  08/07/22 0953

## 2022-09-27 ENCOUNTER — HOSPITAL ENCOUNTER (EMERGENCY)
Facility: OTHER | Age: 32
Discharge: HOME | End: 2022-09-27
Attending: EMERGENCY MEDICINE
Payer: MEDICAID

## 2022-09-27 VITALS
HEART RATE: 89 BPM | BODY MASS INDEX: 23.64 KG/M2 | HEIGHT: 68 IN | TEMPERATURE: 99 F | WEIGHT: 156 LBS | OXYGEN SATURATION: 100 % | RESPIRATION RATE: 17 BRPM | SYSTOLIC BLOOD PRESSURE: 177 MMHG | DIASTOLIC BLOOD PRESSURE: 111 MMHG

## 2022-09-27 DIAGNOSIS — R19.7 DIARRHEA, UNSPECIFIED TYPE: ICD-10-CM

## 2022-09-27 DIAGNOSIS — R11.10 VOMITING, INTRACTABILITY OF VOMITING NOT SPECIFIED, PRESENCE OF NAUSEA NOT SPECIFIED, UNSPECIFIED VOMITING TYPE: Primary | ICD-10-CM

## 2022-09-27 LAB
CTP QC/QA: YES
CTP QC/QA: YES
POC MOLECULAR INFLUENZA A AGN: NEGATIVE
POC MOLECULAR INFLUENZA B AGN: NEGATIVE
SARS-COV-2 RDRP RESP QL NAA+PROBE: NEGATIVE

## 2022-09-27 PROCEDURE — 99284 EMERGENCY DEPT VISIT MOD MDM: CPT

## 2022-09-27 PROCEDURE — U0002 COVID-19 LAB TEST NON-CDC: HCPCS | Performed by: EMERGENCY MEDICINE

## 2022-09-27 PROCEDURE — 87502 INFLUENZA DNA AMP PROBE: CPT

## 2022-09-27 PROCEDURE — 25000003 PHARM REV CODE 250: Performed by: EMERGENCY MEDICINE

## 2022-09-27 RX ORDER — ACETAMINOPHEN 500 MG
1000 TABLET ORAL
Status: COMPLETED | OUTPATIENT
Start: 2022-09-27 | End: 2022-09-27

## 2022-09-27 RX ORDER — LOPERAMIDE HYDROCHLORIDE 2 MG/1
2 CAPSULE ORAL 4 TIMES DAILY PRN
Qty: 12 CAPSULE | Refills: 0 | Status: SHIPPED | OUTPATIENT
Start: 2022-09-27 | End: 2022-10-07

## 2022-09-27 RX ORDER — ONDANSETRON 4 MG/1
4 TABLET, ORALLY DISINTEGRATING ORAL
Status: COMPLETED | OUTPATIENT
Start: 2022-09-27 | End: 2022-09-27

## 2022-09-27 RX ORDER — ONDANSETRON 4 MG/1
4 TABLET, ORALLY DISINTEGRATING ORAL EVERY 6 HOURS PRN
Qty: 15 TABLET | Refills: 0 | Status: SHIPPED | OUTPATIENT
Start: 2022-09-27 | End: 2023-08-11 | Stop reason: CLARIF

## 2022-09-27 RX ADMIN — ONDANSETRON 4 MG: 4 TABLET, ORALLY DISINTEGRATING ORAL at 07:09

## 2022-09-27 RX ADMIN — ACETAMINOPHEN 1000 MG: 500 TABLET, FILM COATED ORAL at 07:09

## 2022-09-27 NOTE — ED PROVIDER NOTES
Encounter Date: 9/27/2022       History     Chief Complaint   Patient presents with    Fatigue     H/A, abd discomfort, fatigue, loose stools x3-4 days. Taking PCN for recent dental procedure.      This is a healthy 32-year-old male who presents for 3 days of nausea, recurrent episodes of vomiting and some diarrhea.  He had taken some Motrin to try and help with this, notes that yesterday he had had recurrent episodes of vomiting but then was able to drink some lemonade last night and then drink a little bit of fluid last night.  He has not been able to eat anything for roughly 2 days which is part of what prompted come the emergency department today as well as wanted to get checked out.  He does endorse some subjective fevers at home.  He has never anything like this in the past he can recall, he does work as a  down at a restaurant and is concerned that he was around some sick people who were in town for the weekend including some children.    Review of patient's allergies indicates:  No Known Allergies  Past Medical History:   Diagnosis Date    Anxiety     Asthma     Depression      History reviewed. No pertinent surgical history.  History reviewed. No pertinent family history.  Social History     Tobacco Use    Smoking status: Every Day     Packs/day: 0.50     Types: Cigarettes    Smokeless tobacco: Never   Substance Use Topics    Alcohol use: No    Drug use: Yes     Types: Marijuana     Review of Systems  Constitutional-no fever  HEENT-no congestion  Eyes-no redness  Respiratory-no shortness of breath  Cardio-no chest pain  GI-positive vomiting, positive diarrhea  Endocrine-no cold intolerance  -no difficulty urinating  MSK-no myalgias  Skin-no rashes  Allergy-no environmental allergy  Neurologic-, no headache  Hematology-no swollen nodes  Behavioral-no confusion  Physical Exam     Initial Vitals [09/27/22 0710]   BP Pulse Resp Temp SpO2   (!) 177/111 89 17 98.5 °F (36.9 °C) 100 %      MAP       --          Physical Exam  Constitutional: Well appearing, no distress.  Eyes: Conjunctivae normal.  ENT       Head: Normocephalic, atraumatic.       Nose: Normal external appearance        Mouth/Throat: no strigulous respirations   Hematological/Lymphatic/Immunilogical: no visible lymphadenopathy   Cardiovascular: Normal rate,   Respiratory: Normal respiratory effort.   Gastrointestinal:  Soft, no rebound, no guarding, negative Navas sign, negative Rovsing sign  Musculoskeletal: Normal range of motion in all extremities. No obvious deformities or swelling.  Neurologic: Alert, oriented. Normal speech and language. No gross focal neurologic deficits are appreciated.  Skin: Skin is warm, dry. No rash noted.  Psychiatric: Mood and affect are normal.   ED Course   Procedures  Labs Reviewed   SARS-COV-2 RDRP GENE    Narrative:     This test utilizes isothermal nucleic acid amplification   technology to detect the SARS-CoV-2 RdRp nucleic acid segment.   The analytical sensitivity (limit of detection) is 125 genome   equivalents/mL.   A POSITIVE result implies infection with the SARS-CoV-2 virus;   the patient is presumed to be contagious.     A NEGATIVE result means that SARS-CoV-2 nucleic acids are not   present above the limit of detection. A NEGATIVE result should be   treated as presumptive. It does not rule out the possibility of   COVID-19 and should not be the sole basis for treatment decisions.   If COVID-19 is strongly suspected based on clinical and exposure   history, re-testing using an alternate molecular assay should be   considered.   This test is only for use under the Food and Drug   Administration s Emergency Use Authorization (EUA).   Commercial kits are provided by [employer].   Performance characteristics of the EUA have been independently   verified by [facility] Department of   Pathology and Laboratory Medicine.   _________________________________________________________________   The  authorized Fact Sheet for Healthcare Providers and the authorized Fact   Sheet for Patients of the ID NOW COVID-19 are available on the FDA   website:     https://www.fda.gov/media/381191/download  https://www.fda.gov/media/961439/download           POCT INFLUENZA A/B MOLECULAR          Imaging Results    None          Medications   ondansetron disintegrating tablet 4 mg (4 mg Oral Given 9/27/22 0740)   acetaminophen tablet 1,000 mg (1,000 mg Oral Given 9/27/22 0741)     Medical Decision Making:   History:   Old Medical Records: I decided to obtain old medical records.  Old Records Summarized: records from clinic visits.  Differential Diagnosis:   Abdominal pain represents a profoundly broad differential, this patient's symptoms are nondescript in nature and while unlikely could represent-  Pancreatitis, cholecystitis, appendicitis, gastritis, cystitis, pyleonephritis, PUD, obstructions constipation neoplasm among a myriad of other diagnoses.     A thorough examination and history was undertaken and appropriate diagnostics ordered with a diagnosis identified as below based on summative findings. It is possible this represents a more sinister underlying process and as such precautions related thereto were specifically discussed with the patient.   Clinical Tests:   Lab Tests: Ordered and Reviewed  ED Management:  Fluid COVID negative, tolerating orals, benign abdominal examination on arrival.  Will plan for discharge, symptom care, follow-up with returning case of any worsening symptoms to the emergency department.                        Clinical Impression:   Final diagnoses:  [R11.10] Vomiting, intractability of vomiting not specified, presence of nausea not specified, unspecified vomiting type (Primary)  [R19.7] Diarrhea, unspecified type        ED Disposition Condition    Discharge Stable          ED Prescriptions       Medication Sig Dispense Start Date End Date Auth. Provider    ondansetron (ZOFRAN-ODT) 4 MG  TbDL Take 1 tablet (4 mg total) by mouth every 6 (six) hours as needed. 15 tablet 9/27/2022 -- Jeremi Truong MD    loperamide (IMODIUM) 2 mg capsule Take 1 capsule (2 mg total) by mouth 4 (four) times daily as needed for Diarrhea. 12 capsule 9/27/2022 10/7/2022 Jeremi Truong MD          Follow-up Information       Follow up With Specialties Details Why Contact Info    [facility] - Emergency Dept Emergency Medicine Go to  As needed [address] [id number] [phone]             Jeremi Truong MD  09/27/22 2457

## 2022-09-27 NOTE — DISCHARGE INSTRUCTIONS
Mr. Fisher,    Thank you for letting me care for you today! It was nice meeting you, and I hope you feel better soon.   If you would like access to your chart and what was done today please utilize the [facility].   Please come back to [facility] for all of your future medical needs.    Our goal in the emergency department is to always give you outstanding care and exceptional service. You may receive a survey by mail or e-mail in the next week regarding your experience in our ED. We would greatly appreciate you completing and returning the survey. Your feedback provides us with a way to recognize our staff who give very good care and it helps us learn how to improve when your experience was below our aspiration of excellence.     Sincerely,    Jeremi Truong MD  Board Certified Emergency Physician

## 2022-09-27 NOTE — Clinical Note
"Victorino Velazquezin" Phillip was seen and treated in our emergency department on 9/27/2022.  He may return to work on 09/28/2022.       If you have any questions or concerns, please don't hesitate to call.      Jeremi Truong MD"

## 2022-09-27 NOTE — ED TRIAGE NOTES
Pt A&O x4. Even and unlabored respirations. Pt c/o FLS including N/V/D, epigastric pain, HA and fatigue x3 days. Pt denies CP, SOB and cough. GCS 15. VSS.

## 2022-09-27 NOTE — ED NOTES
POCT Rapid Covid-19 swab currently in process. Awaiting results.   POCT Influenza A/B Molecular swab currently in process. Awaiting results.

## 2023-05-10 ENCOUNTER — HOSPITAL ENCOUNTER (EMERGENCY)
Facility: OTHER | Age: 33
Discharge: HOME | End: 2023-05-10
Attending: EMERGENCY MEDICINE
Payer: MEDICAID

## 2023-05-10 VITALS
TEMPERATURE: 99 F | SYSTOLIC BLOOD PRESSURE: 156 MMHG | BODY MASS INDEX: 22.28 KG/M2 | HEART RATE: 97 BPM | DIASTOLIC BLOOD PRESSURE: 107 MMHG | WEIGHT: 147 LBS | RESPIRATION RATE: 20 BRPM | OXYGEN SATURATION: 99 % | HEIGHT: 68 IN

## 2023-05-10 DIAGNOSIS — L03.111 CELLULITIS OF RIGHT AXILLA: Primary | ICD-10-CM

## 2023-05-10 DIAGNOSIS — R21 PAPULAR RASH, LOCALIZED: ICD-10-CM

## 2023-05-10 PROCEDURE — 86361 T CELL ABSOLUTE COUNT: CPT | Performed by: PHYSICIAN ASSISTANT

## 2023-05-10 PROCEDURE — 99284 EMERGENCY DEPT VISIT MOD MDM: CPT

## 2023-05-10 RX ORDER — IBUPROFEN 600 MG/1
600 TABLET ORAL EVERY 6 HOURS PRN
Qty: 20 TABLET | Refills: 0 | Status: SHIPPED | OUTPATIENT
Start: 2023-05-10 | End: 2023-08-11 | Stop reason: CLARIF

## 2023-05-10 RX ORDER — SULFAMETHOXAZOLE AND TRIMETHOPRIM 800; 160 MG/1; MG/1
1 TABLET ORAL 2 TIMES DAILY
Qty: 14 TABLET | Refills: 0 | Status: SHIPPED | OUTPATIENT
Start: 2023-05-10 | End: 2023-05-17

## 2023-05-10 NOTE — Clinical Note
"Victorino Fisher (Kevin) was seen and treated in our emergency department on 5/10/2023.  He may return to work on 05/12/2023.       If you have any questions or concerns, please don't hesitate to call.      FESTUS Singh"

## 2023-05-10 NOTE — ED TRIAGE NOTES
Pt reports to ED with abscess/cyst under right axilla that he noticed about 4 days ago. Pt also has some small bumps and redness around the area. Reports he has a hx of cysts. States last night he had a fever of about 101. No drainage from area. Aaox4.

## 2023-05-10 NOTE — ED PROVIDER NOTES
"  Source of History:  Patient     Chief complaint:  Abscess (Pt reports abscess to R axilla x 3 days; t states that it started as small ingrown hair but has gotten bigger since onset; pt also reports fevers at home with tmax 101 last night)      HPI:  Victorino Fisher is a 32 y.o. male presenting with right axilla pain and swelling that began several days ago. He states redness, warmth and pain to the area. He reports fever at home that improved with antivirals. He has not tried any medications today.  He reports known history of HIV with no recent care or antiviral. He denies any recent history of opportunistic infection and reports previous skin eruption of cellulitis and abscess to the axillas that improved with lancing and/or antibiotics.      This is the extent to the patients complaints today here in the emergency department.    ROS: As per HPI and below:  Constitutional: No fever.  No chills.  Eyes: No visual changes.   ENT: No sore throat. No ear pain.  Urinary: No abnormal urination.  MSK: no arthralgias   Integument: + skin eruption +rashes or lesions.    Review of patient's allergies indicates:  No Known Allergies    PMH:  As per HPI and below:  Past Medical History:   Diagnosis Date    Anxiety     Asthma     Depression     Human immunodeficiency virus (HIV) disease      History reviewed. No pertinent surgical history.    Social History     Tobacco Use    Smoking status: Every Day     Packs/day: 0.50     Types: Cigarettes    Smokeless tobacco: Never   Substance Use Topics    Alcohol use: No    Drug use: Yes     Types: Marijuana       Physical Exam:    BP (!) 156/107   Pulse 97   Temp 98.8 °F (37.1 °C) (Oral)   Resp 20   Ht 5' 8" (1.727 m)   Wt 66.7 kg (147 lb)   SpO2 99%   BMI 22.35 kg/m²   Nursing note and vital signs reviewed.  Constitutional: No acute distress.  Eyes: No conjunctival injection.  Extraocular muscles are intact.  ENT: Normal phonation.  Musculoskeletal: localized area of " erythema with mild induration.  No fluctuance or drainage at this time. Few small localized papules. No vesicles or crusted appearence at this time. FROM of affected extremities.  Neurovascularly intact  Skin: see MSK. Good turgor.  No abrasions.  No ecchymoses.  Psych: Appropriate, conversant.          I decided to obtain the patient's medical records.    Results for orders placed or performed during the hospital encounter of 09/27/22   POCT COVID-19 Rapid Screening   Result Value Ref Range    POC Rapid COVID Negative Negative     Acceptable Yes    POCT Influenza A/B Molecular   Result Value Ref Range    POC Molecular Influenza A Ag Negative Negative, Not Reported    POC Molecular Influenza B Ag Negative Negative, Not Reported     Acceptable Yes      Imaging Results    None         MDM:    32 y.o. male with skin eruption.  Physical exam reveals patient in no obvious distress with localized area of erythema with mild induration.  No fluctuance or drainage at this time. Few small localized papules. No vesicles or crusted appearence at this time. FROM of affected extremities.  Neurovascularly intact    DDX: abscess, cellulitis, folliculitis, skin eruption, opportunistic infection, molluscum contagiosum, nonemergent skin eruptions, skin tag    ED management:  Discussed at this time no area of fluctuance or drainable pocket currently.  Will initiate antibiotics.  Given patient's HIV status with unknown CD4 count who will send CD4 level.  Patient is agreeable to reestablish with infectious disease and reach out to case management who will obtain appointment.  Will initiate Bactrim given the possible concern of lower CD4 count and to cover for skin eruption.  Did discuss the atypical appearance of the papular lesions however no additional areas and low suspicion of systemic infection and hence will defer further evaluation of those areas to Dermatology.        Impression/Plan:  The primary  encounter diagnosis was Cellulitis of right axilla. A diagnosis of Papular rash, localized was also pertinent to this visit.  Patient cautioned on when to return to ED.  Pt. Understands and agrees with current treatment plan         Diagnostic Impression:    1. Cellulitis of right axilla    2. Papular rash, localized         ED Disposition Condition    Discharge Stable            ED Prescriptions       Medication Sig Dispense Start Date End Date Auth. Provider    ibuprofen (ADVIL,MOTRIN) 600 MG tablet Take 1 tablet (600 mg total) by mouth every 6 (six) hours as needed for Pain. 20 tablet 5/10/2023 -- FESTUS Singh    sulfamethoxazole-trimethoprim 800-160mg (BACTRIM DS) 800-160 mg Tab Take 1 tablet by mouth 2 (two) times daily. for 7 days 14 tablet 5/10/2023 5/17/2023 FESTUS Singh          Follow-up Information       Follow up With Specialties Details Why Contact Info Additional Information    [facility] Internal Medicine, Family Medicine Schedule an appointment as soon as possible for a visit   [address] [address]  [phone]       [facility] - Infectious Disease [facility] Infectious Diseases Schedule an appointment as soon as possible for a visit   [address] [address] [phone] Main Building, 1st floor near [address] entrance [facility]  parking is available on the first floor of the main parking garage.            Please pardon typos or dictation errors, as this note was transcribed using M*Modal fluency direct dictation software.      FESTUS Singh  05/17/23 1215

## 2023-05-11 LAB
CD3+CD4+ CELLS # BLD: 382 CELLS/UL (ref 300–1400)
CD3+CD4+ CELLS NFR BLD: 23.2 % (ref 28–57)

## 2023-08-11 ENCOUNTER — HOSPITAL ENCOUNTER (INPATIENT)
Facility: HOSPITAL | Age: 33
LOS: 5 days | Discharge: HOME | DRG: 728 | End: 2023-08-16
Attending: EMERGENCY MEDICINE | Admitting: EMERGENCY MEDICINE
Payer: MEDICAID

## 2023-08-11 DIAGNOSIS — N45.1 EPIDIDYMITIS: ICD-10-CM

## 2023-08-11 DIAGNOSIS — B20 SYMPTOMATIC HIV INFECTION: ICD-10-CM

## 2023-08-11 DIAGNOSIS — R07.9 CHEST PAIN: ICD-10-CM

## 2023-08-11 DIAGNOSIS — N45.1 BILATERAL EPIDIDYMITIS: ICD-10-CM

## 2023-08-11 DIAGNOSIS — R59.0 INGUINAL LYMPHADENOPATHY: Primary | ICD-10-CM

## 2023-08-11 DIAGNOSIS — R22.31 MASS OF RIGHT AXILLA: ICD-10-CM

## 2023-08-11 DIAGNOSIS — N50.82 SCROTUM PAIN: ICD-10-CM

## 2023-08-11 DIAGNOSIS — Z72.0 TOBACCO ABUSE: ICD-10-CM

## 2023-08-11 DIAGNOSIS — Z71.6 TOBACCO ABUSE COUNSELING: ICD-10-CM

## 2023-08-11 PROBLEM — D64.9 NORMOCYTIC ANEMIA: Status: ACTIVE | Noted: 2023-08-11

## 2023-08-11 PROBLEM — R59.1 DIFFUSE LYMPHADENOPATHY: Status: ACTIVE | Noted: 2023-08-11

## 2023-08-11 PROBLEM — R21 RASH OF GROIN: Status: ACTIVE | Noted: 2023-08-11

## 2023-08-11 LAB
ALBUMIN SERPL BCP-MCNC: 3.5 G/DL (ref 3.5–5.2)
ALP SERPL-CCNC: 74 U/L (ref 55–135)
ALT SERPL W/O P-5'-P-CCNC: 15 U/L (ref 10–44)
ANION GAP SERPL CALC-SCNC: 10 MMOL/L (ref 8–16)
AST SERPL-CCNC: 25 U/L (ref 10–40)
BASOPHILS # BLD AUTO: 0.05 K/UL (ref 0–0.2)
BASOPHILS NFR BLD: 0.6 % (ref 0–1.9)
BILIRUB SERPL-MCNC: 0.6 MG/DL (ref 0.1–1)
BILIRUB UR QL STRIP: NEGATIVE
BUN SERPL-MCNC: 11 MG/DL (ref 6–20)
CALCIUM SERPL-MCNC: 9.2 MG/DL (ref 8.7–10.5)
CHLORIDE SERPL-SCNC: 102 MMOL/L (ref 95–110)
CLARITY UR REFRACT.AUTO: CLEAR
CO2 SERPL-SCNC: 26 MMOL/L (ref 23–29)
COLOR UR AUTO: YELLOW
CREAT SERPL-MCNC: 0.8 MG/DL (ref 0.5–1.4)
DIFFERENTIAL METHOD: ABNORMAL
EOSINOPHIL # BLD AUTO: 0.1 K/UL (ref 0–0.5)
EOSINOPHIL NFR BLD: 1.3 % (ref 0–8)
ERYTHROCYTE [DISTWIDTH] IN BLOOD BY AUTOMATED COUNT: 14.5 % (ref 11.5–14.5)
EST. GFR  (NO RACE VARIABLE): >60 ML/MIN/1.73 M^2
GLUCOSE SERPL-MCNC: 88 MG/DL (ref 70–110)
GLUCOSE UR QL STRIP: NEGATIVE
HCT VFR BLD AUTO: 35 % (ref 40–54)
HCV AB SERPL QL IA: NORMAL
HGB BLD-MCNC: 10.8 G/DL (ref 14–18)
HGB UR QL STRIP: NEGATIVE
IMM GRANULOCYTES # BLD AUTO: 0.04 K/UL (ref 0–0.04)
IMM GRANULOCYTES NFR BLD AUTO: 0.5 % (ref 0–0.5)
KETONES UR QL STRIP: NEGATIVE
LEUKOCYTE ESTERASE UR QL STRIP: NEGATIVE
LYMPHOCYTES # BLD AUTO: 2.3 K/UL (ref 1–4.8)
LYMPHOCYTES NFR BLD: 29.2 % (ref 18–48)
MCH RBC QN AUTO: 28 PG (ref 27–31)
MCHC RBC AUTO-ENTMCNC: 30.9 G/DL (ref 32–36)
MCV RBC AUTO: 91 FL (ref 82–98)
MONOCYTES # BLD AUTO: 0.4 K/UL (ref 0.3–1)
MONOCYTES NFR BLD: 4.6 % (ref 4–15)
NEUTROPHILS # BLD AUTO: 5 K/UL (ref 1.8–7.7)
NEUTROPHILS NFR BLD: 63.8 % (ref 38–73)
NITRITE UR QL STRIP: NEGATIVE
NRBC BLD-RTO: 0 /100 WBC
PH UR STRIP: 6 [PH] (ref 5–8)
PLATELET # BLD AUTO: 76 K/UL (ref 150–450)
PMV BLD AUTO: 10.5 FL (ref 9.2–12.9)
POTASSIUM SERPL-SCNC: 4 MMOL/L (ref 3.5–5.1)
PROT SERPL-MCNC: 7.6 G/DL (ref 6–8.4)
PROT UR QL STRIP: ABNORMAL
RBC # BLD AUTO: 3.86 M/UL (ref 4.6–6.2)
SODIUM SERPL-SCNC: 138 MMOL/L (ref 136–145)
SP GR UR STRIP: 1.03 (ref 1–1.03)
URN SPEC COLLECT METH UR: ABNORMAL
WBC # BLD AUTO: 7.8 K/UL (ref 3.9–12.7)

## 2023-08-11 PROCEDURE — 99223 PR INITIAL HOSPITAL CARE,LEVL III: ICD-10-PCS | Mod: 25,,, | Performed by: STUDENT IN AN ORGANIZED HEALTH CARE EDUCATION/TRAINING PROGRAM

## 2023-08-11 PROCEDURE — 99407 BEHAV CHNG SMOKING > 10 MIN: CPT | Mod: ,,, | Performed by: STUDENT IN AN ORGANIZED HEALTH CARE EDUCATION/TRAINING PROGRAM

## 2023-08-11 PROCEDURE — G0378 HOSPITAL OBSERVATION PER HR: HCPCS

## 2023-08-11 PROCEDURE — 86361 T CELL ABSOLUTE COUNT: CPT | Performed by: EMERGENCY MEDICINE

## 2023-08-11 PROCEDURE — 80053 COMPREHEN METABOLIC PANEL: CPT | Performed by: EMERGENCY MEDICINE

## 2023-08-11 PROCEDURE — 87536 HIV-1 QUANT&REVRSE TRNSCRPJ: CPT | Performed by: EMERGENCY MEDICINE

## 2023-08-11 PROCEDURE — 25000003 PHARM REV CODE 250: Performed by: STUDENT IN AN ORGANIZED HEALTH CARE EDUCATION/TRAINING PROGRAM

## 2023-08-11 PROCEDURE — 25000003 PHARM REV CODE 250: Performed by: EMERGENCY MEDICINE

## 2023-08-11 PROCEDURE — 63600175 PHARM REV CODE 636 W HCPCS: Performed by: STUDENT IN AN ORGANIZED HEALTH CARE EDUCATION/TRAINING PROGRAM

## 2023-08-11 PROCEDURE — 99204 OFFICE O/P NEW MOD 45 MIN: CPT | Mod: ,,, | Performed by: INTERNAL MEDICINE

## 2023-08-11 PROCEDURE — 87591 N.GONORRHOEAE DNA AMP PROB: CPT | Performed by: EMERGENCY MEDICINE

## 2023-08-11 PROCEDURE — 81003 URINALYSIS AUTO W/O SCOPE: CPT | Performed by: EMERGENCY MEDICINE

## 2023-08-11 PROCEDURE — 63600175 PHARM REV CODE 636 W HCPCS: Performed by: EMERGENCY MEDICINE

## 2023-08-11 PROCEDURE — 86593 SYPHILIS TEST NON-TREP QUANT: CPT | Performed by: EMERGENCY MEDICINE

## 2023-08-11 PROCEDURE — 99223 1ST HOSP IP/OBS HIGH 75: CPT | Mod: 25,,, | Performed by: STUDENT IN AN ORGANIZED HEALTH CARE EDUCATION/TRAINING PROGRAM

## 2023-08-11 PROCEDURE — 99407 PR TOBACCO USE CESSATION INTENSIVE >10 MINUTES: ICD-10-PCS | Mod: ,,, | Performed by: STUDENT IN AN ORGANIZED HEALTH CARE EDUCATION/TRAINING PROGRAM

## 2023-08-11 PROCEDURE — 96372 THER/PROPH/DIAG INJ SC/IM: CPT | Performed by: EMERGENCY MEDICINE

## 2023-08-11 PROCEDURE — 11000001 HC ACUTE MED/SURG PRIVATE ROOM

## 2023-08-11 PROCEDURE — 99285 EMERGENCY DEPT VISIT HI MDM: CPT

## 2023-08-11 PROCEDURE — 86780 TREPONEMA PALLIDUM: CPT | Performed by: EMERGENCY MEDICINE

## 2023-08-11 PROCEDURE — 87593 ORTHOPOXVIRUS AMP PRB EACH: CPT | Performed by: INTERNAL MEDICINE

## 2023-08-11 PROCEDURE — 99204 PR OFFICE/OUTPT VISIT, NEW, LEVL IV, 45-59 MIN: ICD-10-PCS | Mod: ,,, | Performed by: INTERNAL MEDICINE

## 2023-08-11 PROCEDURE — 86592 SYPHILIS TEST NON-TREP QUAL: CPT | Performed by: EMERGENCY MEDICINE

## 2023-08-11 PROCEDURE — 86803 HEPATITIS C AB TEST: CPT | Performed by: PHYSICIAN ASSISTANT

## 2023-08-11 PROCEDURE — 85025 COMPLETE CBC W/AUTO DIFF WBC: CPT | Performed by: EMERGENCY MEDICINE

## 2023-08-11 RX ORDER — TALC
6 POWDER (GRAM) TOPICAL NIGHTLY PRN
Status: DISCONTINUED | OUTPATIENT
Start: 2023-08-11 | End: 2023-08-16 | Stop reason: HOSPADM

## 2023-08-11 RX ORDER — TALC
6 POWDER (GRAM) TOPICAL NIGHTLY PRN
Status: DISCONTINUED | OUTPATIENT
Start: 2023-08-11 | End: 2023-08-11

## 2023-08-11 RX ORDER — PROCHLORPERAZINE EDISYLATE 5 MG/ML
5 INJECTION INTRAMUSCULAR; INTRAVENOUS EVERY 6 HOURS PRN
Status: DISCONTINUED | OUTPATIENT
Start: 2023-08-11 | End: 2023-08-16 | Stop reason: HOSPADM

## 2023-08-11 RX ORDER — DOXYCYCLINE HYCLATE 100 MG
100 TABLET ORAL EVERY 12 HOURS
Status: DISCONTINUED | OUTPATIENT
Start: 2023-08-11 | End: 2023-08-16 | Stop reason: HOSPADM

## 2023-08-11 RX ORDER — DOXYCYCLINE HYCLATE 100 MG
100 TABLET ORAL
Status: COMPLETED | OUTPATIENT
Start: 2023-08-11 | End: 2023-08-11

## 2023-08-11 RX ORDER — ACETAMINOPHEN 325 MG/1
650 TABLET ORAL EVERY 4 HOURS PRN
Status: DISCONTINUED | OUTPATIENT
Start: 2023-08-11 | End: 2023-08-16 | Stop reason: HOSPADM

## 2023-08-11 RX ORDER — MORPHINE SULFATE 4 MG/ML
4 INJECTION, SOLUTION INTRAMUSCULAR; INTRAVENOUS
Status: COMPLETED | OUTPATIENT
Start: 2023-08-11 | End: 2023-08-11

## 2023-08-11 RX ORDER — POLYETHYLENE GLYCOL 3350 17 G/17G
17 POWDER, FOR SOLUTION ORAL DAILY PRN
Status: DISCONTINUED | OUTPATIENT
Start: 2023-08-11 | End: 2023-08-16 | Stop reason: HOSPADM

## 2023-08-11 RX ORDER — SODIUM CHLORIDE 0.9 % (FLUSH) 0.9 %
10 SYRINGE (ML) INJECTION EVERY 8 HOURS
Status: DISCONTINUED | OUTPATIENT
Start: 2023-08-11 | End: 2023-08-16 | Stop reason: HOSPADM

## 2023-08-11 RX ORDER — ONDANSETRON 2 MG/ML
4 INJECTION INTRAMUSCULAR; INTRAVENOUS EVERY 8 HOURS PRN
Status: DISCONTINUED | OUTPATIENT
Start: 2023-08-11 | End: 2023-08-16 | Stop reason: HOSPADM

## 2023-08-11 RX ORDER — MAG HYDROX/ALUMINUM HYD/SIMETH 200-200-20
30 SUSPENSION, ORAL (FINAL DOSE FORM) ORAL 4 TIMES DAILY PRN
Status: DISCONTINUED | OUTPATIENT
Start: 2023-08-11 | End: 2023-08-16 | Stop reason: HOSPADM

## 2023-08-11 RX ORDER — IPRATROPIUM BROMIDE AND ALBUTEROL SULFATE 2.5; .5 MG/3ML; MG/3ML
3 SOLUTION RESPIRATORY (INHALATION) EVERY 6 HOURS PRN
Status: DISCONTINUED | OUTPATIENT
Start: 2023-08-11 | End: 2023-08-16 | Stop reason: HOSPADM

## 2023-08-11 RX ORDER — CEFTRIAXONE 500 MG/1
500 INJECTION, POWDER, FOR SOLUTION INTRAMUSCULAR; INTRAVENOUS
Status: COMPLETED | OUTPATIENT
Start: 2023-08-11 | End: 2023-08-11

## 2023-08-11 RX ORDER — SODIUM CHLORIDE 0.9 % (FLUSH) 0.9 %
10 SYRINGE (ML) INJECTION
Status: DISCONTINUED | OUTPATIENT
Start: 2023-08-11 | End: 2023-08-16 | Stop reason: HOSPADM

## 2023-08-11 RX ORDER — SIMETHICONE 80 MG
1 TABLET,CHEWABLE ORAL 4 TIMES DAILY PRN
Status: DISCONTINUED | OUTPATIENT
Start: 2023-08-11 | End: 2023-08-16 | Stop reason: HOSPADM

## 2023-08-11 RX ORDER — NALOXONE HCL 0.4 MG/ML
0.02 VIAL (ML) INJECTION
Status: DISCONTINUED | OUTPATIENT
Start: 2023-08-11 | End: 2023-08-16 | Stop reason: HOSPADM

## 2023-08-11 RX ORDER — HYDROCODONE BITARTRATE AND ACETAMINOPHEN 10; 325 MG/1; MG/1
1 TABLET ORAL EVERY 6 HOURS PRN
Status: DISCONTINUED | OUTPATIENT
Start: 2023-08-11 | End: 2023-08-12

## 2023-08-11 RX ADMIN — MORPHINE SULFATE 4 MG: 4 INJECTION INTRAVENOUS at 04:08

## 2023-08-11 RX ADMIN — DOXYCYCLINE HYCLATE 100 MG: 100 TABLET, COATED ORAL at 02:08

## 2023-08-11 RX ADMIN — VANCOMYCIN HYDROCHLORIDE 500 MG: 500 INJECTION, POWDER, LYOPHILIZED, FOR SOLUTION INTRAVENOUS at 05:08

## 2023-08-11 RX ADMIN — Medication 6 MG: at 10:08

## 2023-08-11 RX ADMIN — CEFTRIAXONE SODIUM 500 MG: 500 INJECTION, POWDER, FOR SOLUTION INTRAMUSCULAR; INTRAVENOUS at 02:08

## 2023-08-11 RX ADMIN — HYDROCODONE BITARTRATE AND ACETAMINOPHEN 1 TABLET: 10; 325 TABLET ORAL at 10:08

## 2023-08-11 RX ADMIN — VANCOMYCIN HYDROCHLORIDE 1000 MG: 1 INJECTION, POWDER, LYOPHILIZED, FOR SOLUTION INTRAVENOUS at 04:08

## 2023-08-11 NOTE — LETTER
August 16, 2023       Department of Hospital Medicine  [address] [facility] [address]  Phone: [phone]  Fax: [phone]       Patient: Victorino Fisher   YOB: 1990  Date of Visit: 08/16/2023    To whom it may concern,      Mr. Fisher was hospitalized under my care from 8/11/23 to 8/16/23 at [facility]. Please excuse him for any time missed. He may return to work without restriction on 8/21/23.      Thank you,      Gwyn Mora MD  Attending Physician  Medical Director - [facility]  Department of Hospital Medicine  8/16/2023

## 2023-08-11 NOTE — ASSESSMENT & PLAN NOTE
- Pt with 0.5 ppd smoking history for most of adult life  - Currently without plans to quit  - Counseled pt >10 minutes on the importance of smoking cessation in relation to health

## 2023-08-11 NOTE — HPI
"Mr. Fisher is a 33M with PMH of HIV (CD4 382 in May 2023), presents with L groin pain. Infectious disease consulted for "rash".     Patient states that he was diagnosed with HIV earlier this year, but has not been able to establish care with anyone and has not been on retrovirals. He states the lesions in the R underarm began as a couple small lesions in May, and have since progressed. He states they started "leaking" so he put some tissue/napkin on it. He states it is tender to palpation. He has some lesions in L groin and at the base of the penis as well, with some associated swelling.   "

## 2023-08-11 NOTE — ED NOTES
Bed: 37  Expected date:   Expected time:   Means of arrival:   Comments:  CCR  
Pt aware of urine sample needing to be collected and verbalized understanding. Urine cup given to pt.  
Pt transported to US  
Name band;

## 2023-08-11 NOTE — SUBJECTIVE & OBJECTIVE
Past Medical History:   Diagnosis Date    Anxiety     Asthma     Depression     Human immunodeficiency virus (HIV) disease        History reviewed. No pertinent surgical history.    Review of patient's allergies indicates:  No Known Allergies    Medications:  (Not in a hospital admission)    Antibiotics (From admission, onward)      Start     Stop Route Frequency Ordered    08/11/23 1800  piperacillin-tazobactam (ZOSYN) 4.5 g in dextrose 5 % in water (D5W) 100 mL IVPB (MB+)         -- IV Every 8 hours (non-standard times) 08/11/23 1603    08/11/23 1730  vancomycin (VANCOCIN) 500 mg in dextrose 5 % in water (D5W) 100 mL IVPB (MB+)         08/12/23 0529 IV ED 1 Time 08/11/23 1612    08/11/23 1703  vancomycin - pharmacy to dose  (vancomycin IVPB (PEDS and ADULTS))        See Hyperspace for full Linked Orders Report.    -- IV pharmacy to manage frequency 08/11/23 1603    08/11/23 1500  vancomycin (VANCOCIN) 1,000 mg in dextrose 5 % (D5W) 250 mL IVPB (Vial-Mate)         08/12/23 0259 IV ED 1 Time 08/11/23 1450          Antifungals (From admission, onward)      None          Antivirals (From admission, onward)      None               There is no immunization history on file for this patient.    Family History    None       Social History     Socioeconomic History    Marital status: Single   Tobacco Use    Smoking status: Every Day     Current packs/day: 0.50     Types: Cigarettes    Smokeless tobacco: Never   Substance and Sexual Activity    Alcohol use: No    Drug use: Yes     Types: Marijuana     Review of Systems   Constitutional:  Negative for chills and fever.   Respiratory:  Negative for cough and shortness of breath.    Cardiovascular:  Negative for chest pain, palpitations and leg swelling.   Gastrointestinal:  Negative for abdominal pain, constipation, diarrhea, nausea and vomiting.   Genitourinary:  Negative for dysuria and hematuria.   Musculoskeletal:  Negative for arthralgias and myalgias.   Skin:  Positive  for rash.   Neurological:  Negative for weakness and headaches.     Objective:     Vital Signs (Most Recent):  Temp: 98.6 °F (37 °C) (08/11/23 0932)  Pulse: 94 (08/11/23 1413)  Resp: 14 (08/11/23 1600)  BP: (!) 154/83 (08/11/23 1408)  SpO2: 100 % (08/11/23 1413) Vital Signs (24h Range):  Temp:  [98.6 °F (37 °C)] 98.6 °F (37 °C)  Pulse:  [90-94] 94  Resp:  [14-18] 14  SpO2:  [98 %-100 %] 100 %  BP: (140-154)/(83-90) 154/83     Weight: 66.7 kg (147 lb)  Body mass index is 22.35 kg/m².    Estimated Creatinine Clearance: 123.9 mL/min (based on SCr of 0.8 mg/dL).     Physical Exam  Vitals reviewed.   Constitutional:       General: He is not in acute distress.     Appearance: Normal appearance. He is not ill-appearing.   HENT:      Head: Normocephalic and atraumatic.   Eyes:      Extraocular Movements: Extraocular movements intact.      Conjunctiva/sclera: Conjunctivae normal.   Cardiovascular:      Rate and Rhythm: Normal rate and regular rhythm.      Heart sounds: No murmur heard.  Pulmonary:      Effort: Pulmonary effort is normal. No respiratory distress.      Breath sounds: Normal breath sounds.   Abdominal:      General: Abdomen is flat. Bowel sounds are normal.      Palpations: Abdomen is soft.      Tenderness: There is no abdominal tenderness.   Genitourinary:     Comments: Small flesh colored lesions in L groin and base of penis  Musculoskeletal:      Cervical back: Normal range of motion.      Comments: Numerous flesh colored lesions in R underarm, tender to palpation, no active drainage   Skin:     General: Skin is warm and dry.   Neurological:      General: No focal deficit present.      Mental Status: He is alert and oriented to person, place, and time.   Psychiatric:         Mood and Affect: Mood normal.         Behavior: Behavior normal.         Thought Content: Thought content normal.          Significant Labs: All pertinent labs within the past 24 hours have been reviewed.  Recent Lab Results          08/11/23  1413   08/11/23  1054        Albumin   3.5       Alkaline Phosphatase   74       ALT   15       Anion Gap   10       Appearance, UA Clear         AST   25       Baso #   0.05       Basophil %   0.6       Bilirubin (UA) Negative         BILIRUBIN TOTAL   0.6  Comment: For infants and newborns, interpretation of results should be based  on gestational age, weight and in agreement with clinical  observations.    Premature Infant recommended reference ranges:  Up to 24 hours.............<8.0 mg/dL  Up to 48 hours............<12.0 mg/dL  3-5 days..................<15.0 mg/dL  6-29 days.................<15.0 mg/dL         BUN   11       Calcium   9.2       Chloride   102       CO2   26       Color, UA Yellow         Creatinine   0.8       Differential Method   Automated       eGFR   >60.0       Eos #   0.1       Eosinophil %   1.3       Glucose   88       Glucose, UA Negative         Gran # (ANC)   5.0       Gran %   63.8       Hematocrit   35.0       Hemoglobin   10.8       Hepatitis C Ab   Non-reactive       Immature Grans (Abs)   0.04  Comment: Mild elevation in immature granulocytes is non specific and   can be seen in a variety of conditions including stress response,   acute inflammation, trauma and pregnancy. Correlation with other   laboratory and clinical findings is essential.         Immature Granulocytes   0.5       Ketones, UA Negative         Leukocytes, UA Negative         Lymph #   2.3       Lymph %   29.2       MCH   28.0       MCHC   30.9       MCV   91       Mono #   0.4       Mono %   4.6       MPV   10.5       NITRITE UA Negative         nRBC   0       Occult Blood UA Negative         pH, UA 6.0         Platelets   76       Potassium   4.0       PROTEIN TOTAL   7.6       Protein, UA Trace  Comment: Recommend a 24 hour urine protein or a urine   protein/creatinine ratio if globulin induced proteinuria is  clinically suspected.           RBC   3.86       RDW   14.5       Sodium   138        Specific [place], UA 1.030         Specimen UA Urine, Clean Catch         WBC   7.80               Significant Imaging: I have reviewed all pertinent imaging results/findings within the past 24 hours.

## 2023-08-11 NOTE — ASSESSMENT & PLAN NOTE
- Interval history and physical exam findings as described above  - Infectious workup pending  - On vanc and zosyn  - Derm and ID consulted, appreciate assistance

## 2023-08-11 NOTE — HPI
"Victorino Fisher is a 32yo M with a PMH of HIV (not on HAART) and tobacco abuse who presented to the [facility] ED on 8/11/23 with complaints of scrotal pain and pain in his right axilla. Pt reports an "ingrown hair" in his right armpit 3 weeks prior, which has allegedly enlarged into a mass over this short time pain; associated with occasional purulent discharge and pain with ROM. States he has not thought much of it. Also with 2-3 day history of BL testicular pain and scrotal swelling. Denies F/C/N/V/D/C, HA, SOB, CP, palpitations, abdominal pain, dysuria, extremity swelling, or symptoms otherwise.    In the ED, VSS. Labs remarkable for Hb 10.8, though were otherwise grossly asymptomatic. Scrotal US showed sonographic findings suggestive of bilateral epididymitis with scrotal wall thickening, bilateral groin masses measuring up to 3.4 cm which likely represent pathologic lymphadenopathy, bilateral epididymal cysts, punctate left testicular cystic focus too small to characterize, and bilateral varicoceles. ED gave doxy, rocephin, and vanc and consulted ID and derm. Hospital medicine was consulted for admission and further management.  "

## 2023-08-11 NOTE — ASSESSMENT & PLAN NOTE
- Interval history and physical exam findings as described above  - Scrotal US findings reviewed  - Infectious workup pending  - On vanc and zosyn  - Afebrile, no leukocytosis  - ID consulted, appreciate assistance  - PRN analgesics provided  - Continuing to monitor

## 2023-08-11 NOTE — ED TRIAGE NOTES
Patient presents to the ED today with reports of groin pain and swelling x1 week. Patient states testicles swelling. Denies any discharge    Patient identifiers verified and correct for Victorino Fisher  LOC: The patient is awake, alert and aware of environment with an appropriate affect, the patient is oriented x 3 and speaking appropriately.   APPEARANCE: Patient appears comfortable and in no acute distress, patient is clean and well groomed.  SKIN: The skin is warm and dry, color consistent with ethnicity, patient has normal skin turgor and moist mucus membranes, skin intact, no breakdown or bruising noted.   MUSCULOSKELETAL: Patient moving all extremities spontaneously, no swelling noted.  RESPIRATORY: Airway is open and patent, respirations are spontaneous, patient has a normal effort and rate, no accessory muscle use noted, O2 sats noted at 97% on room air.  CARDIAC: Denies chest pain  GASTRO: Soft and non tender to palpation, no distention noted, normoactive bowel sounds present in all four quadrants. Pt states bowel movements have been regular.  : Pt denies any pain or frequency with urination. Testicle swelling and pain  NEURO: Pt opens eyes spontaneously, behavior appropriate to situation, follows commands, facial expression symmetrical, bilateral hand grasp equal and even, purposeful motor response noted, normal sensation in all extremities when touched with a finger.

## 2023-08-11 NOTE — ASSESSMENT & PLAN NOTE
- Interval history and physical exam findings as described above  - Suspect this has been developing longer than the 3 weeks he reported  - Infectious workup pending  - On vanc and zosyn  - Derm and ID consulted, appreciate assistance

## 2023-08-11 NOTE — Clinical Note
Diagnosis: Inguinal lymphadenopathy [952206]   Future Attending Provider: KARLA WATTS [[id number]]   Admitting Provider:: ABIDA OBREGON [[id number]]

## 2023-08-11 NOTE — ED PROVIDER NOTES
Encounter Date: 8/11/2023       History     Chief Complaint   Patient presents with    Groin Pain     X2 days. Denies injury, reports radiates to stomach. Worse to left side than right.      33-year-old male with a history of HIV though not on antiviral medication, unclear when diagnosis initially established but I can see and HIV test from 2018 that was positive, CD4 count in May 2023 was 384, patient now presenting with scrotal pain, left side, onset in the last 1-2 weeks the patient inconsistent with his history, associated with a worsening rash to his groin region.  Patient denies any penile discharge.  He denies any dysuria frequency or urgency.  Patient states the last time he was sexually active was in May, 3 months ago.  Patient denies a history of STIs though in the chart it does look as though he has a history of syphilis and condyloma, in addition to his HIV diagnosis.  Patient denies any fevers, chills, weight loss, night sweats.    The history is provided by the patient.     Review of patient's allergies indicates:  No Known Allergies  Past Medical History:   Diagnosis Date    Anxiety     Asthma     Depression     Human immunodeficiency virus (HIV) disease      History reviewed. No pertinent surgical history.  History reviewed. No pertinent family history.  Social History     Tobacco Use    Smoking status: Every Day     Current packs/day: 0.50     Types: Cigarettes    Smokeless tobacco: Never   Substance Use Topics    Alcohol use: No    Drug use: Yes     Types: Marijuana     Review of Systems    Physical Exam     Initial Vitals [08/11/23 0932]   BP Pulse Resp Temp SpO2   (!) 140/90 90 18 98.6 °F (37 °C) 98 %      MAP       --         Physical Exam    Nursing note and vitals reviewed.  Constitutional: He appears cachectic. He is cooperative. He does not appear ill. No distress.   Eyes: Conjunctivae are normal.   Neck: Neck supple.   Cardiovascular:  Normal rate.           Pulmonary/Chest: No respiratory  distress.   Abdominal: Abdomen is soft. He exhibits no distension. There is no abdominal tenderness. There is no rebound and no guarding.   Genitourinary: Right testis shows swelling. Left testis shows swelling. Uncircumcised. Phimosis present. No penile erythema. No discharge found.    Genitourinary Comments: Significant, tender enlarged inguinal lymphadenopathy noted bilaterally though left-greater-than-right.  Patient with multiple nodular and/or condylomatous looking lesions in the suprapubic and inguinal region bilaterally though left is greater than right.     Musculoskeletal:      Cervical back: Neck supple.     Neurological: He is alert.   Skin:        See included picture but patient with large round nodular cluster of masses to his right axillary region, tender, no active drainage                     ED Course   Procedures  Labs Reviewed   URINALYSIS, REFLEX TO URINE CULTURE - Abnormal; Notable for the following components:       Result Value    Protein, UA Trace (*)     All other components within normal limits    Narrative:     Specimen Source->Urine   CBC W/ AUTO DIFFERENTIAL - Abnormal; Notable for the following components:    RBC 3.86 (*)     Hemoglobin 10.8 (*)     Hematocrit 35.0 (*)     MCHC 30.9 (*)     Platelets 76 (*)     All other components within normal limits    Narrative:     Release to patient->Immediate   C. TRACHOMATIS/N. GONORRHOEAE BY AMP DNA   C. TRACHOMATIS/N. GONORRHOEAE BY AMP DNA   HEPATITIS C ANTIBODY    Narrative:     Release to patient->Immediate   COMPREHENSIVE METABOLIC PANEL    Narrative:     Release to patient->Immediate   T-HELPER CELLS (CD4) COUNT   HIV RNA, QUANTITATIVE, PCR   RPR   T-HELPER CELLS (CD4) COUNT   HIV RNA, QUANTITATIVE, PCR   MPOX PCR          Imaging Results               US Scrotum And Testicles (Final result)  Result time 08/11/23 13:34:33      Final result by Alfonzo Cronin MD (08/11/23 13:34:33)                   Impression:      Sonographic findings  suggestive of bilateral epididymitis with scrotal wall thickening.    Bilateral groin masses measuring up to 3.4 cm which likely represent pathologic lymphadenopathy.  Recommend clinical evaluation.    Bilateral epididymal cysts.    Punctate left testicular cystic focus too small to characterize.    Bilateral varicoceles.    This report was flagged in Epic as abnormal.    Electronically signed by resident: Sam Zavala  Date:    08/11/2023  Time:    13:14    Electronically signed by: Alfonzo Cronin MD  Date:    08/11/2023  Time:    13:34               Narrative:    EXAMINATION:  US SCROTUM AND TESTICLES    CLINICAL HISTORY:  Scrotal pain    Additional history: HIV.    TECHNIQUE:  Sonography of the scrotum and testes.    COMPARISON:  CT abdomen pelvis 10/14/2017    FINDINGS:  Right Testicle:    *Size: 3.9 x 2.2 x 2.3 cm  *Appearance: Homogeneous echotexture.  Increased vascularity.  *Flow: Normal arterial and venous flow  *Epididymis: Increased vascularity.  Anechoic epididymal cysts measuring up the 5 mm.  *Hydrocele: None.  *Varicocele: Present.  Left Testicle:    *Size: 4.0 x 2.2 x 2.7 cm  *Appearance: Homogeneous echotexture.  Punctate anechoic focus measuring less than 1 mm.  *Flow: Normal arterial and venous flow  *Epididymis: Increased vascularity.  Epididymal anechoic cysts measuring up to 3 mm.  *Hydrocele: None.  *Varicocele: Present.  Other findings: Scrotal wall thickening.  Bilateral groin solid appearing irregular isoechoic to hypoechoic vascular masses measuring up to 2.3 x 2.0 x 1.8 cm on the right and 3.7 x 3.1 x 3.4 cm on the left                                       Medications   vancomycin (VANCOCIN) 1,000 mg in dextrose 5 % (D5W) 250 mL IVPB (Vial-Mate) (1,000 mg Intravenous New Bag 8/11/23 0115)   sodium chloride 0.9% flush 10 mL (has no administration in time range)   melatonin tablet 6 mg (has no administration in time range)   sodium chloride 0.9% flush 10 mL (has no administration in time  range)   albuterol-ipratropium 2.5 mg-0.5 mg/3 mL nebulizer solution 3 mL (has no administration in time range)   ondansetron injection 4 mg (has no administration in time range)   prochlorperazine injection Soln 5 mg (has no administration in time range)   polyethylene glycol packet 17 g (has no administration in time range)   simethicone chewable tablet 80 mg (has no administration in time range)   aluminum-magnesium hydroxide-simethicone 200-200-20 mg/5 mL suspension 30 mL (has no administration in time range)   acetaminophen tablet 650 mg (has no administration in time range)   naloxone 0.4 mg/mL injection 0.02 mg (has no administration in time range)   HYDROcodone-acetaminophen  mg per tablet 1 tablet (has no administration in time range)   vancomycin - pharmacy to dose (has no administration in time range)   piperacillin-tazobactam (ZOSYN) 4.5 g in dextrose 5 % in water (D5W) 100 mL IVPB (MB+) (has no administration in time range)   vancomycin (VANCOCIN) 500 mg in dextrose 5 % in water (D5W) 100 mL IVPB (MB+) (has no administration in time range)   doxycycline tablet 100 mg (100 mg Oral Given 8/11/23 1442)   cefTRIAXone injection 500 mg (500 mg Intramuscular Given 8/11/23 1442)   morphine injection 4 mg (4 mg Intravenous Given 8/11/23 1600)     Medical Decision Making:   History:   Old Medical Records: I decided to obtain old medical records.  Old Records Summarized: records from clinic visits and records from previous admission(s).  Initial Assessment:   Patient with untreated HIV illness, unclear why he is not established care in the outpatient setting, to provide me with the reason  Differential Diagnosis:   Epididymitis/orchitis  Lymphogranuloma venereum  Monkey pox  Severe condyloma illness  Clinical Tests:   Lab Tests: Reviewed and Ordered  Radiological Study: Ordered and Reviewed  ED Management:  Labs  Infectious disease consult  Reassess    Ultrasound consistent with bilateral epididymitis.   Ceftriaxone 500 mg IM and doxycycline 100 mg p.o. ordered.  Vancomycin added on later as infectious disease physician concern that lesions to right axillary region have a superimposed bacterial infection.  Other:   I have discussed this case with another health care provider.       <> Summary of the Discussion: Infectious disease attending            Attending Attestation:         Attending Critical Care:   Critical Care Times:   ==============================================================  Total Critical Care Time - exclusive of procedural time: 30 minutes.  ==============================================================  Critical care was necessary to treat or prevent imminent or life-threatening deterioration of the following conditions: sepsis.   Critical care was time spent personally by me on the following activities: obtaining history from patient or relative, examination of patient, review of old charts, review of x-rays / CT sent with the patient, ordering lab, x-rays, and/or EKG, development of treatment plan with patient or relative, ordering and performing treatments and interventions, evaluation of patient's response to treatment, discussion with consultants and re-evaluation of patient's conition.   Critical Care Condition: potentially life-threatening           ED Course as of 08/11/23 1648   Fri Aug 11, 2023   1458 Infectious Disease has evaluated the patient.  They are also uncertain about the etiology of these lesions but differential diagnosis would include bacillary angiomatosis, kaposi sarcoma, condyloma with superimposed infections, monkey pox.  We all agree that the best course of action is to admit the patient, given broad-spectrum antibiotics for possible superimposed skin infection, consult Dermatology for biopsy. [AS]      ED Course User Index  [AS] Edilia Aden MD                 Clinical Impression:   Final diagnoses:  [N50.82] Scrotum pain  [R59.0] Inguinal lymphadenopathy  (Primary)  [N45.1] Epididymitis        ED Disposition Condition    Observation Stable                Edilia Aden MD  08/11/23 [id number]

## 2023-08-11 NOTE — CONSULTS
"[facility] - Emergency Dept  Infectious Disease  Consult Note    Patient Name: Victorino Fisher  MRN: [id number]  Admission Date: 8/11/2023  Hospital Length of Stay: 0 days  Attending Physician: Gwyn Mora MD  Primary Care Provider: Mahogany, Primary Doctor     Isolation Status: Contact and Airborne    Patient information was obtained from patient and ER records.      Inpatient consult to Infectious Diseases  Consult performed by: Christina Balderas DO  Consult ordered by: Edilia Aden MD        Assessment/Plan:     Derm  Rash of groin  33M with PMH of HIV (CD4 382 in May 2023), presents with L groin and R axilla lesions.     Recommendations:  · Start doxycycline 100mg BID   · Follow up RPR, Mpox  · Recommend dermatology consult for biopsy of axilla lesions      ID  HIV (human immunodeficiency virus infection)  Reports diagnosis earlier this year, however has positive result in Care everywhere from 2018. Has not established care, never been on ART    Recommendations  · Follow up CD4 and viral load        Thank you for your consult. I will follow-up with patient. Please contact us if you have any additional questions.    Christina Balderas DO  Infectious Disease  [facility] - Emergency Dept    Subjective:     Principal Problem: Bilateral epididymitis    HPI: Mr. Fisher is a 33M with PMH of HIV (CD4 382 in May 2023), presents with L groin pain. Infectious disease consulted for "rash".     Patient states that he was diagnosed with HIV earlier this year, but has not been able to establish care with anyone and has not been on retrovirals. He states the lesions in the R underarm began as a couple small lesions in May, and have since progressed. He states they started "leaking" so he put some tissue/napkin on it. He states it is tender to palpation. He has some lesions in L groin and at the base of the penis as well, with some associated swelling.       Past Medical History:   Diagnosis Date    Anxiety     Asthma     " Depression     Human immunodeficiency virus (HIV) disease        History reviewed. No pertinent surgical history.    Review of patient's allergies indicates:  No Known Allergies    Medications:  (Not in a hospital admission)    Antibiotics (From admission, onward)      Start     Stop Route Frequency Ordered    08/11/23 1800  piperacillin-tazobactam (ZOSYN) 4.5 g in dextrose 5 % in water (D5W) 100 mL IVPB (MB+)         -- IV Every 8 hours (non-standard times) 08/11/23 1603    08/11/23 1730  vancomycin (VANCOCIN) 500 mg in dextrose 5 % in water (D5W) 100 mL IVPB (MB+)         08/12/23 0529 IV ED 1 Time 08/11/23 1612    08/11/23 1703  vancomycin - pharmacy to dose  (vancomycin IVPB (PEDS and ADULTS))        See Hyperspace for full Linked Orders Report.    -- IV pharmacy to manage frequency 08/11/23 1603    08/11/23 1500  vancomycin (VANCOCIN) 1,000 mg in dextrose 5 % (D5W) 250 mL IVPB (Vial-Mate)         08/12/23 0259 IV ED 1 Time 08/11/23 1450          Antifungals (From admission, onward)      None          Antivirals (From admission, onward)      None               There is no immunization history on file for this patient.    Family History    None       Social History     Socioeconomic History    Marital status: Single   Tobacco Use    Smoking status: Every Day     Current packs/day: 0.50     Types: Cigarettes    Smokeless tobacco: Never   Substance and Sexual Activity    Alcohol use: No    Drug use: Yes     Types: Marijuana     Review of Systems   Constitutional:  Negative for chills and fever.   Respiratory:  Negative for cough and shortness of breath.    Cardiovascular:  Negative for chest pain, palpitations and leg swelling.   Gastrointestinal:  Negative for abdominal pain, constipation, diarrhea, nausea and vomiting.   Genitourinary:  Negative for dysuria and hematuria.   Musculoskeletal:  Negative for arthralgias and myalgias.   Skin:  Positive for rash.   Neurological:  Negative for weakness and  headaches.     Objective:     Vital Signs (Most Recent):  Temp: 98.6 °F (37 °C) (08/11/23 0932)  Pulse: 94 (08/11/23 1413)  Resp: 14 (08/11/23 1600)  BP: (!) 154/83 (08/11/23 1408)  SpO2: 100 % (08/11/23 1413) Vital Signs (24h Range):  Temp:  [98.6 °F (37 °C)] 98.6 °F (37 °C)  Pulse:  [90-94] 94  Resp:  [14-18] 14  SpO2:  [98 %-100 %] 100 %  BP: (140-154)/(83-90) 154/83     Weight: 66.7 kg (147 lb)  Body mass index is 22.35 kg/m².    Estimated Creatinine Clearance: 123.9 mL/min (based on SCr of 0.8 mg/dL).     Physical Exam  Vitals reviewed.   Constitutional:       General: He is not in acute distress.     Appearance: Normal appearance. He is not ill-appearing.   HENT:      Head: Normocephalic and atraumatic.   Eyes:      Extraocular Movements: Extraocular movements intact.      Conjunctiva/sclera: Conjunctivae normal.   Cardiovascular:      Rate and Rhythm: Normal rate and regular rhythm.      Heart sounds: No murmur heard.  Pulmonary:      Effort: Pulmonary effort is normal. No respiratory distress.      Breath sounds: Normal breath sounds.   Abdominal:      General: Abdomen is flat. Bowel sounds are normal.      Palpations: Abdomen is soft.      Tenderness: There is no abdominal tenderness.   Genitourinary:     Comments: Small flesh colored lesions in L groin and base of penis  Musculoskeletal:      Cervical back: Normal range of motion.      Comments: Numerous flesh colored lesions in R underarm, tender to palpation, no active drainage   Skin:     General: Skin is warm and dry.   Neurological:      General: No focal deficit present.      Mental Status: He is alert and oriented to person, place, and time.   Psychiatric:         Mood and Affect: Mood normal.         Behavior: Behavior normal.         Thought Content: Thought content normal.          Significant Labs: All pertinent labs within the past 24 hours have been reviewed.  Recent Lab Results         08/11/23  1413   08/11/23  1054        Albumin   3.5        Alkaline Phosphatase   74       ALT   15       Anion Gap   10       Appearance, UA Clear         AST   25       Baso #   0.05       Basophil %   0.6       Bilirubin (UA) Negative         BILIRUBIN TOTAL   0.6  Comment: For infants and newborns, interpretation of results should be based  on gestational age, weight and in agreement with clinical  observations.    Premature Infant recommended reference ranges:  Up to 24 hours.............<8.0 mg/dL  Up to 48 hours............<12.0 mg/dL  3-5 days..................<15.0 mg/dL  6-29 days.................<15.0 mg/dL         BUN   11       Calcium   9.2       Chloride   102       CO2   26       Color, UA Yellow         Creatinine   0.8       Differential Method   Automated       eGFR   >60.0       Eos #   0.1       Eosinophil %   1.3       Glucose   88       Glucose, UA Negative         Gran # (ANC)   5.0       Gran %   63.8       Hematocrit   35.0       Hemoglobin   10.8       Hepatitis C Ab   Non-reactive       Immature Grans (Abs)   0.04  Comment: Mild elevation in immature granulocytes is non specific and   can be seen in a variety of conditions including stress response,   acute inflammation, trauma and pregnancy. Correlation with other   laboratory and clinical findings is essential.         Immature Granulocytes   0.5       Ketones, UA Negative         Leukocytes, UA Negative         Lymph #   2.3       Lymph %   29.2       MCH   28.0       MCHC   30.9       MCV   91       Mono #   0.4       Mono %   4.6       MPV   10.5       NITRITE UA Negative         nRBC   0       Occult Blood UA Negative         pH, UA 6.0         Platelets   76       Potassium   4.0       PROTEIN TOTAL   7.6       Protein, UA Trace  Comment: Recommend a 24 hour urine protein or a urine   protein/creatinine ratio if globulin induced proteinuria is  clinically suspected.           RBC   3.86       RDW   14.5       Sodium   138       Specific Gravity, UA 1.030         Specimen UA Urine, Clean  Catch         WBC   7.80               Significant Imaging: I have reviewed all pertinent imaging results/findings within the past 24 hours.

## 2023-08-11 NOTE — ASSESSMENT & PLAN NOTE
33M with PMH of HIV (CD4 382 in May 2023), presents with L groin and R axilla lesions.     Recommendations:  · Start doxycycline 100mg BID   · Follow up RPR, Mpox  · Recommend dermatology consult for biopsy of axilla lesions

## 2023-08-11 NOTE — PROGRESS NOTES
"Pharmacokinetic Initial Assessment: IV Vancomycin    Assessment/Plan:    Vancomycin 1000 mg IVPB x1 given in ED. Administer additional vancomycin 500 mg IVPB x1 for 1500 mg total load dose. Follow by a maintenance dose of vancomycin 1000mg IV every 12 hours.  Desired empiric serum trough concentration is 10 to 20 mcg/mL.  Draw vancomycin trough level 60 min prior to fourth dose on 08/13 at 0300.  Pharmacy will continue to follow and monitor vancomycin.      Please contact pharmacy at extension [phone] with any questions regarding this assessment.     Thank you for the consult,   Bj Camarillo       Patient brief summary:  Victorino Fisher is a 33 y.o. male initiated on antimicrobial therapy with IV Vancomycin for treatment of suspected skin & soft tissue infection.    Drug Allergies:   Review of patient's allergies indicates:  No Known Allergies    Actual Body Weight:   66.7    Renal Function:   Estimated Creatinine Clearance: 123.9 mL/min (based on SCr of 0.8 mg/dL).     CBC (last 72 hours):  Recent Labs   Lab Result Units 08/11/23  1054   WBC K/uL 7.80   Hemoglobin g/dL 10.8*   Hematocrit % 35.0*   Platelets K/uL 76*   Gran % % 63.8   Lymph % % 29.2   Mono % % 4.6   Eosinophil % % 1.3   Basophil % % 0.6   Differential Method  Automated       Metabolic Panel (last 72 hours):  Recent Labs   Lab Result Units 08/11/23  1054 08/11/23  1413   Sodium mmol/L 138  --    Potassium mmol/L 4.0  --    Chloride mmol/L 102  --    CO2 mmol/L 26  --    Glucose mg/dL 88  --    Glucose, UA   --  Negative   BUN mg/dL 11  --    Creatinine mg/dL 0.8  --    Albumin g/dL 3.5  --    Total Bilirubin mg/dL 0.6  --    Alkaline Phosphatase U/L 74  --    AST U/L 25  --    ALT U/L 15  --        Drug levels (last 3 results):  No results for input(s): "VANCOMYCINRA", "VANCORANDOM", "VANCOMYCINPE", "VANCOPEAK", "VANCOMYCINTR", "VANCOTROUGH" in the last 72 hours.    Microbiologic Results:  Microbiology Results (last 7 days)       Procedure " Component Value Units Date/Time    C. trachomatis/N. gonorrhoeae by AMP DNA [facility]; Urine [[id number]] Collected: 08/11/23 1422    Order Status: Sent Specimen: Genital Updated: 08/11/23 1443    C. trachomatis/N. gonorrhoeae by AMP DNA [facility]; Urethra [[id number]] Collected: 08/11/23 1413    Order Status: Sent Specimen: Genital Updated: 08/11/23 1413

## 2023-08-11 NOTE — ASSESSMENT & PLAN NOTE
Reports diagnosis earlier this year, however has positive result in Care everywhere from 2018. Has not established care, never been on ART    Recommendations  · Follow up CD4 and viral load

## 2023-08-11 NOTE — H&P
"[facility] - Emergency Dept  [facility] Medicine  History & Physical    Patient Name: Victorino Fisher  MRN: [id number]  Patient Class: OP- Observation  Admission Date: 8/11/2023  Attending Physician: Gwyn Mora MD   Primary Care Provider: Mahogany, Primary Doctor         Patient information was obtained from patient and ER records.     Subjective:     Principal Problem:Bilateral epididymitis    Chief Complaint:   Chief Complaint   Patient presents with    Groin Pain     X2 days. Denies injury, reports radiates to stomach. Worse to left side than right.         HPI: Victorino Fisher is a 32yo M with a PMH of HIV (not on HAART) and tobacco abuse who presented to the [facility] ED on 8/11/23 with complaints of scrotal pain and pain in his right axilla. Pt reports an "ingrown hair" in his right armpit 3 weeks prior, which has allegedly enlarged into a mass over this short time pain; associated with occasional purulent discharge and pain with ROM. States he has not thought much of it. Also with 2-3 day history of BL testicular pain and scrotal swelling. Denies F/C/N/V/D/C, HA, SOB, CP, palpitations, abdominal pain, dysuria, extremity swelling, or symptoms otherwise.    In the ED, VSS. Labs remarkable for Hb 10.8, though were otherwise grossly asymptomatic. Scrotal US showed sonographic findings suggestive of bilateral epididymitis with scrotal wall thickening, bilateral groin masses measuring up to 3.4 cm which likely represent pathologic lymphadenopathy, bilateral epididymal cysts, punctate left testicular cystic focus too small to characterize, and bilateral varicoceles. ED gave doxy, rocephin, and vanc and consulted ID and derm. Hospital medicine was consulted for admission and further management.      Past Medical History:   Diagnosis Date    Anxiety     Asthma     Depression     Human immunodeficiency virus (HIV) disease        History reviewed. No pertinent surgical history.    Review of patient's allergies indicates:  No " Known Allergies    No current facility-administered medications on file prior to encounter.     Current Outpatient Medications on File Prior to Encounter   Medication Sig    [DISCONTINUED] ibuprofen (ADVIL,MOTRIN) 600 MG tablet Take 1 tablet (600 mg total) by mouth every 6 (six) hours as needed for Pain.    [DISCONTINUED] ondansetron (ZOFRAN-ODT) 4 MG TbDL Take 1 tablet (4 mg total) by mouth every 6 (six) hours as needed (nausea).    [DISCONTINUED] ondansetron (ZOFRAN-ODT) 4 MG TbDL Take 1 tablet (4 mg total) by mouth every 6 (six) hours as needed.     Family History    None       Tobacco Use    Smoking status: Every Day     Current packs/day: 0.50     Types: Cigarettes    Smokeless tobacco: Never   Substance and Sexual Activity    Alcohol use: No    Drug use: Yes     Types: Marijuana    Sexual activity: Not on file       ROS  General ROS: negative for weight loss, weight gain, fevers, chills, night sweats  ENT ROS: negative for epistaxis, headaches or sinus pain  Ophthalmic ROS: negative for blurry vision, decreased vision, double vision or itchy eyes  Cardiovascular ROS: negative for chest pain or dyspnea on exertion  Respiratory ROS: negative for cough, shortness of breath, or wheezing  Gastrointestinal ROS: negative for abdominal pain, change in bowel habits, black or bloody stools, nausea, emesis, or bloating  Genito-Urinary ROS: See HPI  Neurological ROS: negative for TIA or stroke symptoms  Endocrine ROS: negative for hair pattern changes or unexpected weight changes  Musculoskeletal ROS: negative for muscle pain, weakness, joint pain or joint swelling  Skin: See HPI  Hematological and Lymphatic ROS: negative for bleeding, bruising, swollen lymph nodes  Psychological ROS: negative for anxiety or depression      Objective:     Vital Signs (Most Recent):  Temp: 98.6 °F (37 °C) (08/11/23 0932)  Pulse: 94 (08/11/23 1413)  Resp: 18 (08/11/23 0932)  BP: (!) 154/83 (08/11/23 1408)  SpO2: 100 % (08/11/23  1413) Vital Signs (24h Range):  Temp:  [98.6 °F (37 °C)] 98.6 °F (37 °C)  Pulse:  [90-94] 94  Resp:  [18] 18  SpO2:  [98 %-100 %] 100 %  BP: (140-154)/(83-90) 154/83     Weight: 66.7 kg (147 lb)  Body mass index is 22.35 kg/m².       Physical Exam  Gen: in NAD, appears stated age; generalized LAD  Neuro: AAOx4, CN2-12 grossly intact BL; motor, sensory, and strength grossly intact BL  HEENT: NTNC, EOMI, PERRLA, MMM; no thyromegaly or lymphadenopathy; no JVD appreciated  CVS: RRR, no m/r/g; S1/S2 auscultated with no S3 or S4; capillary refill < 2 sec  Resp: lungs CTAB, no w/r/r; no belabored breathing or accessory muscle use appreciated   Abd: BS+ in all 4 quadrants; NTND, soft to palpation; no organomegaly appreciated  : testes and penile shaft edematous and tender to palpation, raise rash noted above the pubis (see below)  Extrem: pulses full, equal, and regular over all 4 extremities; no UE or LE edema BL; see images below of right axillae                   Significant Labs: All pertinent labs within the past 24 hours have been reviewed.    Significant Imaging: I have reviewed all pertinent imaging results/findings within the past 24 hours.      Assessment/Plan:     * Bilateral epididymitis  - Interval history and physical exam findings as described above  - Scrotal US findings reviewed  - Infectious workup pending  - On vanc and zosyn  - Afebrile, no leukocytosis  - ID consulted, appreciate assistance  - PRN analgesics provided  - Continuing to monitor    Mass of right axilla  - Interval history and physical exam findings as described above  - Suspect this has been developing longer than the 3 weeks he reported  - Infectious workup pending  - On vanc and zosyn  - Derm and ID consulted, appreciate assistance    Rash of groin  - Interval history and physical exam findings as described above  - Infectious workup pending  - On vanc and zosyn  - Derm and ID consulted, appreciate assistance    Diffuse  lymphadenopathy  - Likely related to infectious source in the setting of untreated HIV    HIV (human immunodeficiency virus infection)  - Noncompliant with HAART  - CD4 count pending  - ID consulted, appreciate recs    Normocytic anemia  - H/H lower than previous on admission  - Will continue to monitor on daily labs    Tobacco abuse  - Pt with 0.5 ppd smoking history for most of adult life  - Currently without plans to quit  - Counseled pt >10 minutes on the importance of smoking cessation in relation to health     Tobacco abuse counseling  - As above      VTE Risk Mitigation (From admission, onward)         Ordered     Place sequential compression device  Until discontinued         08/11/23 1555     IP VTE LOW RISK PATIENT  Once         08/11/23 1555     Place sequential compression device  Until discontinued         08/11/23 1555                   On 08/11/2023, patient should be placed in hospital observation services under my care.        Gwyn Mora MD  Attending Physician  Medical Director - [facility] Observation Unit  Department of Hospital Medicine  8/11/2023

## 2023-08-11 NOTE — SUBJECTIVE & OBJECTIVE
Past Medical History:   Diagnosis Date    Anxiety     Asthma     Depression     Human immunodeficiency virus (HIV) disease        History reviewed. No pertinent surgical history.    Review of patient's allergies indicates:  No Known Allergies    No current facility-administered medications on file prior to encounter.     Current Outpatient Medications on File Prior to Encounter   Medication Sig    [DISCONTINUED] ibuprofen (ADVIL,MOTRIN) 600 MG tablet Take 1 tablet (600 mg total) by mouth every 6 (six) hours as needed for Pain.    [DISCONTINUED] ondansetron (ZOFRAN-ODT) 4 MG TbDL Take 1 tablet (4 mg total) by mouth every 6 (six) hours as needed (nausea).    [DISCONTINUED] ondansetron (ZOFRAN-ODT) 4 MG TbDL Take 1 tablet (4 mg total) by mouth every 6 (six) hours as needed.     Family History    None       Tobacco Use    Smoking status: Every Day     Current packs/day: 0.50     Types: Cigarettes    Smokeless tobacco: Never   Substance and Sexual Activity    Alcohol use: No    Drug use: Yes     Types: Marijuana    Sexual activity: Not on file       ROS  General ROS: negative for weight loss, weight gain, fevers, chills, night sweats  ENT ROS: negative for epistaxis, headaches or sinus pain  Ophthalmic ROS: negative for blurry vision, decreased vision, double vision or itchy eyes  Cardiovascular ROS: negative for chest pain or dyspnea on exertion  Respiratory ROS: negative for cough, shortness of breath, or wheezing  Gastrointestinal ROS: negative for abdominal pain, change in bowel habits, black or bloody stools, nausea, emesis, or bloating  Genito-Urinary ROS: See HPI  Neurological ROS: negative for TIA or stroke symptoms  Endocrine ROS: negative for hair pattern changes or unexpected weight changes  Musculoskeletal ROS: negative for muscle pain, weakness, joint pain or joint swelling  Skin: See HPI  Hematological and Lymphatic ROS: negative for bleeding, bruising, swollen lymph nodes  Psychological ROS: negative for  anxiety or depression      Objective:     Vital Signs (Most Recent):  Temp: 98.6 °F (37 °C) (08/11/23 0932)  Pulse: 94 (08/11/23 1413)  Resp: 18 (08/11/23 0932)  BP: (!) 154/83 (08/11/23 1408)  SpO2: 100 % (08/11/23 1413) Vital Signs (24h Range):  Temp:  [98.6 °F (37 °C)] 98.6 °F (37 °C)  Pulse:  [90-94] 94  Resp:  [18] 18  SpO2:  [98 %-100 %] 100 %  BP: (140-154)/(83-90) 154/83     Weight: 66.7 kg (147 lb)  Body mass index is 22.35 kg/m².       Physical Exam  Gen: in NAD, appears stated age; generalized LAD  Neuro: AAOx4, CN2-12 grossly intact BL; motor, sensory, and strength grossly intact BL  HEENT: NTNC, EOMI, PERRLA, MMM; no thyromegaly or lymphadenopathy; no JVD appreciated  CVS: RRR, no m/r/g; S1/S2 auscultated with no S3 or S4; capillary refill < 2 sec  Resp: lungs CTAB, no w/r/r; no belabored breathing or accessory muscle use appreciated   Abd: BS+ in all 4 quadrants; NTND, soft to palpation; no organomegaly appreciated  : testes and penile shaft edematous and tender to palpation, raise rash noted above the pubis (see below)  Extrem: pulses full, equal, and regular over all 4 extremities; no UE or LE edema BL; see images below of right axillae                   Significant Labs: All pertinent labs within the past 24 hours have been reviewed.    Significant Imaging: I have reviewed all pertinent imaging results/findings within the past 24 hours.

## 2023-08-12 PROBLEM — K13.70 MOUTH LESION: Status: ACTIVE | Noted: 2023-08-12

## 2023-08-12 PROBLEM — A53.9 SYPHILIS: Status: ACTIVE | Noted: 2023-08-12

## 2023-08-12 LAB
ALBUMIN SERPL BCP-MCNC: 2.8 G/DL (ref 3.5–5.2)
ALP SERPL-CCNC: 65 U/L (ref 55–135)
ALT SERPL W/O P-5'-P-CCNC: 11 U/L (ref 10–44)
ANION GAP SERPL CALC-SCNC: 12 MMOL/L (ref 8–16)
AST SERPL-CCNC: 27 U/L (ref 10–40)
BASOPHILS # BLD AUTO: 0.02 K/UL (ref 0–0.2)
BASOPHILS NFR BLD: 0.3 % (ref 0–1.9)
BILIRUB SERPL-MCNC: 0.5 MG/DL (ref 0.1–1)
BUN SERPL-MCNC: 11 MG/DL (ref 6–20)
C TRACH DNA SPEC QL NAA+PROBE: NOT DETECTED
CALCIUM SERPL-MCNC: 8.5 MG/DL (ref 8.7–10.5)
CHLORIDE SERPL-SCNC: 101 MMOL/L (ref 95–110)
CO2 SERPL-SCNC: 21 MMOL/L (ref 23–29)
CREAT SERPL-MCNC: 0.7 MG/DL (ref 0.5–1.4)
DIFFERENTIAL METHOD: ABNORMAL
EOSINOPHIL # BLD AUTO: 0.2 K/UL (ref 0–0.5)
EOSINOPHIL NFR BLD: 2 % (ref 0–8)
ERYTHROCYTE [DISTWIDTH] IN BLOOD BY AUTOMATED COUNT: 14.2 % (ref 11.5–14.5)
EST. GFR  (NO RACE VARIABLE): >60 ML/MIN/1.73 M^2
GLUCOSE SERPL-MCNC: 101 MG/DL (ref 70–110)
HCT VFR BLD AUTO: 28.8 % (ref 40–54)
HGB BLD-MCNC: 9.1 G/DL (ref 14–18)
IMM GRANULOCYTES # BLD AUTO: 0.03 K/UL (ref 0–0.04)
IMM GRANULOCYTES NFR BLD AUTO: 0.4 % (ref 0–0.5)
LYMPHOCYTES # BLD AUTO: 2.5 K/UL (ref 1–4.8)
LYMPHOCYTES NFR BLD: 33 % (ref 18–48)
MAGNESIUM SERPL-MCNC: 1.6 MG/DL (ref 1.6–2.6)
MCH RBC QN AUTO: 28.1 PG (ref 27–31)
MCHC RBC AUTO-ENTMCNC: 31.6 G/DL (ref 32–36)
MCV RBC AUTO: 89 FL (ref 82–98)
MONOCYTES # BLD AUTO: 0.5 K/UL (ref 0.3–1)
MONOCYTES NFR BLD: 6.7 % (ref 4–15)
N GONORRHOEA DNA SPEC QL NAA+PROBE: DETECTED
NEUTROPHILS # BLD AUTO: 4.3 K/UL (ref 1.8–7.7)
NEUTROPHILS NFR BLD: 57.6 % (ref 38–73)
NRBC BLD-RTO: 0 /100 WBC
PHOSPHATE SERPL-MCNC: 3.9 MG/DL (ref 2.7–4.5)
PLATELET # BLD AUTO: 63 K/UL (ref 150–450)
PMV BLD AUTO: 10.4 FL (ref 9.2–12.9)
POTASSIUM SERPL-SCNC: 4.1 MMOL/L (ref 3.5–5.1)
PROT SERPL-MCNC: 6.1 G/DL (ref 6–8.4)
RBC # BLD AUTO: 3.24 M/UL (ref 4.6–6.2)
RPR SER QL: REACTIVE
RPR SER-TITR: ABNORMAL {TITER}
SODIUM SERPL-SCNC: 134 MMOL/L (ref 136–145)
WBC # BLD AUTO: 7.51 K/UL (ref 3.9–12.7)

## 2023-08-12 PROCEDURE — 87186 SC STD MICRODIL/AGAR DIL: CPT | Performed by: STUDENT IN AN ORGANIZED HEALTH CARE EDUCATION/TRAINING PROGRAM

## 2023-08-12 PROCEDURE — 83735 ASSAY OF MAGNESIUM: CPT | Performed by: STUDENT IN AN ORGANIZED HEALTH CARE EDUCATION/TRAINING PROGRAM

## 2023-08-12 PROCEDURE — 99233 SBSQ HOSP IP/OBS HIGH 50: CPT | Mod: ,,, | Performed by: STUDENT IN AN ORGANIZED HEALTH CARE EDUCATION/TRAINING PROGRAM

## 2023-08-12 PROCEDURE — 99233 PR SUBSEQUENT HOSPITAL CARE,LEVL III: ICD-10-PCS | Mod: ,,, | Performed by: STUDENT IN AN ORGANIZED HEALTH CARE EDUCATION/TRAINING PROGRAM

## 2023-08-12 PROCEDURE — 87015 SPECIMEN INFECT AGNT CONCNTJ: CPT | Performed by: STUDENT IN AN ORGANIZED HEALTH CARE EDUCATION/TRAINING PROGRAM

## 2023-08-12 PROCEDURE — 36415 COLL VENOUS BLD VENIPUNCTURE: CPT | Performed by: STUDENT IN AN ORGANIZED HEALTH CARE EDUCATION/TRAINING PROGRAM

## 2023-08-12 PROCEDURE — 87075 CULTR BACTERIA EXCEPT BLOOD: CPT | Performed by: STUDENT IN AN ORGANIZED HEALTH CARE EDUCATION/TRAINING PROGRAM

## 2023-08-12 PROCEDURE — 87206 SMEAR FLUORESCENT/ACID STAI: CPT | Performed by: STUDENT IN AN ORGANIZED HEALTH CARE EDUCATION/TRAINING PROGRAM

## 2023-08-12 PROCEDURE — G0378 HOSPITAL OBSERVATION PER HR: HCPCS

## 2023-08-12 PROCEDURE — 87077 CULTURE AEROBIC IDENTIFY: CPT | Performed by: STUDENT IN AN ORGANIZED HEALTH CARE EDUCATION/TRAINING PROGRAM

## 2023-08-12 PROCEDURE — 87102 FUNGUS ISOLATION CULTURE: CPT | Performed by: STUDENT IN AN ORGANIZED HEALTH CARE EDUCATION/TRAINING PROGRAM

## 2023-08-12 PROCEDURE — 85025 COMPLETE CBC W/AUTO DIFF WBC: CPT | Performed by: STUDENT IN AN ORGANIZED HEALTH CARE EDUCATION/TRAINING PROGRAM

## 2023-08-12 PROCEDURE — 87116 MYCOBACTERIA CULTURE: CPT | Performed by: STUDENT IN AN ORGANIZED HEALTH CARE EDUCATION/TRAINING PROGRAM

## 2023-08-12 PROCEDURE — A4216 STERILE WATER/SALINE, 10 ML: HCPCS | Performed by: STUDENT IN AN ORGANIZED HEALTH CARE EDUCATION/TRAINING PROGRAM

## 2023-08-12 PROCEDURE — 63600175 PHARM REV CODE 636 W HCPCS: Performed by: STUDENT IN AN ORGANIZED HEALTH CARE EDUCATION/TRAINING PROGRAM

## 2023-08-12 PROCEDURE — 25000003 PHARM REV CODE 250: Performed by: STUDENT IN AN ORGANIZED HEALTH CARE EDUCATION/TRAINING PROGRAM

## 2023-08-12 PROCEDURE — 99233 PR SUBSEQUENT HOSPITAL CARE,LEVL III: ICD-10-PCS | Mod: ,,, | Performed by: INTERNAL MEDICINE

## 2023-08-12 PROCEDURE — 99233 SBSQ HOSP IP/OBS HIGH 50: CPT | Mod: ,,, | Performed by: INTERNAL MEDICINE

## 2023-08-12 PROCEDURE — 84100 ASSAY OF PHOSPHORUS: CPT | Performed by: STUDENT IN AN ORGANIZED HEALTH CARE EDUCATION/TRAINING PROGRAM

## 2023-08-12 PROCEDURE — 11000001 HC ACUTE MED/SURG PRIVATE ROOM

## 2023-08-12 PROCEDURE — 80053 COMPREHEN METABOLIC PANEL: CPT | Performed by: STUDENT IN AN ORGANIZED HEALTH CARE EDUCATION/TRAINING PROGRAM

## 2023-08-12 PROCEDURE — 87070 CULTURE OTHR SPECIMN AEROBIC: CPT | Performed by: STUDENT IN AN ORGANIZED HEALTH CARE EDUCATION/TRAINING PROGRAM

## 2023-08-12 PROCEDURE — 25000003 PHARM REV CODE 250: Performed by: INTERNAL MEDICINE

## 2023-08-12 RX ORDER — HYDROCODONE BITARTRATE AND ACETAMINOPHEN 10; 325 MG/1; MG/1
1 TABLET ORAL EVERY 6 HOURS PRN
Status: DISCONTINUED | OUTPATIENT
Start: 2023-08-12 | End: 2023-08-13

## 2023-08-12 RX ADMIN — PIPERACILLIN SODIUM AND TAZOBACTAM SODIUM 4.5 G: 4; .5 INJECTION, POWDER, FOR SOLUTION INTRAVENOUS at 11:08

## 2023-08-12 RX ADMIN — DOXYCYCLINE HYCLATE 100 MG: 100 TABLET, COATED ORAL at 12:08

## 2023-08-12 RX ADMIN — DOXYCYCLINE HYCLATE 100 MG: 100 TABLET, COATED ORAL at 09:08

## 2023-08-12 RX ADMIN — HYDROCODONE BITARTRATE AND ACETAMINOPHEN 1 TABLET: 10; 325 TABLET ORAL at 11:08

## 2023-08-12 RX ADMIN — Medication 10 ML: at 06:08

## 2023-08-12 RX ADMIN — VANCOMYCIN HYDROCHLORIDE 1000 MG: 1 INJECTION, POWDER, LYOPHILIZED, FOR SOLUTION INTRAVENOUS at 08:08

## 2023-08-12 RX ADMIN — Medication 10 ML: at 10:08

## 2023-08-12 RX ADMIN — PIPERACILLIN SODIUM AND TAZOBACTAM SODIUM 4.5 G: 4; .5 INJECTION, POWDER, FOR SOLUTION INTRAVENOUS at 03:08

## 2023-08-12 RX ADMIN — HYDROCODONE BITARTRATE AND ACETAMINOPHEN 1 TABLET: 10; 325 TABLET ORAL at 08:08

## 2023-08-12 RX ADMIN — PIPERACILLIN SODIUM AND TAZOBACTAM SODIUM 4.5 G: 4; .5 INJECTION, POWDER, FOR SOLUTION INTRAVENOUS at 05:08

## 2023-08-12 RX ADMIN — VANCOMYCIN HYDROCHLORIDE 1000 MG: 1 INJECTION, POWDER, LYOPHILIZED, FOR SOLUTION INTRAVENOUS at 09:08

## 2023-08-12 RX ADMIN — DOXYCYCLINE HYCLATE 100 MG: 100 TABLET, COATED ORAL at 08:08

## 2023-08-12 RX ADMIN — HYDROCODONE BITARTRATE AND ACETAMINOPHEN 1 TABLET: 10; 325 TABLET ORAL at 05:08

## 2023-08-12 NOTE — PROGRESS NOTES
"[facility] [address] [place] [address] Medicine  Progress Note    Patient Name: Victorino Fisher  MRN: [id number]  Patient Class: OP- Observation   Admission Date: 8/11/2023  Length of Stay: 0 days  Attending Physician: Gwyn Mora MD  Primary Care Provider: [place], Primary Doctor        Subjective:     Principal Problem:Bilateral epididymitis        HPI:  Victorino Fisher is a 34yo M with a PMH of HIV (not on HAART) and tobacco abuse who presented to the [facility] ED on 8/11/23 with complaints of scrotal pain and pain in his right axilla. Pt reports an "ingrown hair" in his right armpit 3 weeks prior, which has allegedly enlarged into a mass over this short time pain; associated with occasional purulent discharge and pain with ROM. States he has not thought much of it. Also with 2-3 day history of BL testicular pain and scrotal swelling. Denies F/C/N/V/D/C, HA, SOB, CP, palpitations, abdominal pain, dysuria, extremity swelling, or symptoms otherwise.    In the ED, VSS. Labs remarkable for Hb 10.8, though were otherwise grossly asymptomatic. Scrotal US showed sonographic findings suggestive of bilateral epididymitis with scrotal wall thickening, bilateral groin masses measuring up to 3.4 cm which likely represent pathologic lymphadenopathy, bilateral epididymal cysts, punctate left testicular cystic focus too small to characterize, and bilateral varicoceles. ED gave doxy, rocephin, and vanc and consulted ID and derm. Hospital medicine was consulted for admission and further management.      Overview/Hospital Course:  Pt admitted to [facility] and started on empiric vanc and zosyn; doxy added per ID recs. Dermatology consulted for biopsy of right axillary lesion.      Interval History: Pt seen and examined this morning on silvestre. JEREMIAH. Pain much unchanged, norco helping. Discussed continuing abx while awaiting infectious work-up and ID and derm recs. Care plan reviewed. Otherwise, doing well and with no further complaints at this " time.        Objective:     Vital Signs (Most Recent):  Temp: 98.6 °F (37 °C) (08/12/23 0747)  Pulse: 91 (08/12/23 0747)  Resp: 18 (08/12/23 0854)  BP: 135/84 (08/12/23 0747)  SpO2: 98 % (08/12/23 0747) Vital Signs (24h Range):  Temp:  [98.3 °F (36.8 °C)-98.6 °F (37 °C)] 98.6 °F (37 °C)  Pulse:  [86-97] 91  Resp:  [14-18] 18  SpO2:  [98 %-100 %] 98 %  BP: (132-154)/(72-90) 135/84     Weight: 66.7 kg (147 lb 0.8 oz)  Body mass index is 22.36 kg/m².    Intake/Output Summary (Last 24 hours) at 8/12/2023 0931  Last data filed at 8/11/2023 1737  Gross per 24 hour   Intake 2.92 ml   Output --   Net 2.92 ml         Physical Exam  Gen: in NAD, appears stated age; generalized LAD  Neuro: AAOx4, CN2-12 grossly intact BL; motor, sensory, and strength grossly intact BL  HEENT: NTNC, EOMI, PERRLA, MMM; no thyromegaly or lymphadenopathy; no JVD appreciated  CVS: RRR, no m/r/g; S1/S2 auscultated with no S3 or S4; capillary refill < 2 sec  Resp: lungs CTAB, no w/r/r; no belabored breathing or accessory muscle use appreciated   Abd: BS+ in all 4 quadrants; NTND, soft to palpation; no organomegaly appreciated  : testes and penile shaft edematous and tender to palpation, raise rash noted above the pubis (see below)  Extrem: pulses full, equal, and regular over all 4 extremities; no UE or LE edema BL; see images below of right axillae                   Significant Labs: All pertinent labs within the past 24 hours have been reviewed.    Significant Imaging: I have reviewed all pertinent imaging results/findings within the past 24 hours.      Assessment/Plan:      * Bilateral epididymitis  - Interval history and physical exam findings as described above  - Scrotal US findings reviewed  - Infectious workup pending  - On vanc, zosyn, and doxy per ID  - Afebrile, no leukocytosis  - ID consulted, appreciate assistance  - PRN analgesics provided  - Continuing to monitor    Mass of right axilla  - Interval history and physical exam findings  as described above  - Suspect this has been developing longer than the 3 weeks he reported  - Infectious workup pending  - On vanc and zosyn  - Derm and ID consulted, appreciate assistance    Rash of groin  - Interval history and physical exam findings as described above  - Infectious workup pending  - On vanc and zosyn  - Derm and ID consulted, appreciate assistance    Diffuse lymphadenopathy  - Likely related to infectious source in the setting of untreated HIV    HIV (human immunodeficiency virus infection)  - Noncompliant with HAART  - CD4 count pending  - ID consulted, appreciate recs    Normocytic anemia  - H/H lower than previous on admission  - Will continue to monitor on daily labs    Tobacco abuse  - Pt with 0.5 ppd smoking history for most of adult life  - Currently without plans to quit  - Counseled on the importance of smoking cessation in relation to health     Tobacco abuse counseling  - As above      VTE Risk Mitigation (From admission, onward)         Ordered     Place sequential compression device  Until discontinued         08/11/23 1555     IP VTE LOW RISK PATIENT  Once         08/11/23 1555     Place sequential compression device  Until discontinued         08/11/23 1555                Discharge Planning   MAX: 8/14/2023     Code Status: Full Code   Is the patient medically ready for discharge?: No    Reason for patient still in hospital (select all that apply): Patient trending condition             Gwyn Mora MD  Attending Physician  Medical Director - [facility]  Department of Hospital Medicine  8/12/2023

## 2023-08-12 NOTE — ASSESSMENT & PLAN NOTE
- Interval history and physical exam findings as described above  - Scrotal US findings reviewed  - Infectious workup pending  - On vanc, zosyn, and doxy per ID  - Afebrile, no leukocytosis  - ID consulted, appreciate assistance  - PRN analgesics provided  - Continuing to monitor

## 2023-08-12 NOTE — ASSESSMENT & PLAN NOTE
Most common causes in younger people would be gonorrhea and chlamydia.  Patient received ceftriaxone 500 mg IM X 1.  Currently on doxycycline 100 mg po bid.  Will follow up on results of screening for Gonorrhea and Chlamydia.

## 2023-08-12 NOTE — ASSESSMENT & PLAN NOTE
- Pt with 0.5 ppd smoking history for most of adult life  - Currently without plans to quit  - Counseled on the importance of smoking cessation in relation to health

## 2023-08-12 NOTE — HOSPITAL COURSE
Pt admitted to [facility] and started on empiric vanc and zosyn; doxy added per ID recs. Received IM rocephin in the ED for gonorrhea. Dermatology consulted for biopsy of right axillary lesion; path pending. RPR reactive, consistent with active syphilis, on doxy per ID. ENT consulted for oral lesion, biopsied on 8/14. Wound culture of right axillary mass growing S.aureus as a superimposed infection. Started on HAART by ID on 8/15. Plastics and gen surg were consulted for surgical removal of right axillary mass, though deferred inpatient intervention. Pt deemed appropriate for discharge to complete outpatient course of doxy for S.aureus cellulitis and syphilis. SW/CM enlisted for assistance in records being sent and appointments established at [facility]. Plan discussed with pt, who was agreeable and amenable; medications were discussed and reviewed, outpatient follow-up scheduled, ER precautions were given, all questions were answered to the pt's satisfaction, and Mr. Fisher was subsequently discharged.

## 2023-08-12 NOTE — ASSESSMENT & PLAN NOTE
Attention was called to a black lesion on the oral mucosa to the right.  Etiology unclear.  EBV mediated disease is possible.  He is a smoker also which would make some form of cancer possible.  Will monitor for now and see if it responds to antibiotics and HAART therapy.

## 2023-08-12 NOTE — SUBJECTIVE & OBJECTIVE
Interval History: Pt seen and examined this morning on aiden DANIELS. Pain much unchanged, norco helping. Discussed continuing abx while awaiting infectious work-up and ID and derm recs. Care plan reviewed. Otherwise, doing well and with no further complaints at this time.        Objective:     Vital Signs (Most Recent):  Temp: 98.6 °F (37 °C) (08/12/23 0747)  Pulse: 91 (08/12/23 0747)  Resp: 18 (08/12/23 0854)  BP: 135/84 (08/12/23 0747)  SpO2: 98 % (08/12/23 0747) Vital Signs (24h Range):  Temp:  [98.3 °F (36.8 °C)-98.6 °F (37 °C)] 98.6 °F (37 °C)  Pulse:  [86-97] 91  Resp:  [14-18] 18  SpO2:  [98 %-100 %] 98 %  BP: (132-154)/(72-90) 135/84     Weight: 66.7 kg (147 lb 0.8 oz)  Body mass index is 22.36 kg/m².    Intake/Output Summary (Last 24 hours) at 8/12/2023 0931  Last data filed at 8/11/2023 1737  Gross per 24 hour   Intake 2.92 ml   Output --   Net 2.92 ml         Physical Exam  Gen: in NAD, appears stated age; generalized LAD  Neuro: AAOx4, CN2-12 grossly intact BL; motor, sensory, and strength grossly intact BL  HEENT: NTNC, EOMI, PERRLA, MMM; no thyromegaly or lymphadenopathy; no JVD appreciated  CVS: RRR, no m/r/g; S1/S2 auscultated with no S3 or S4; capillary refill < 2 sec  Resp: lungs CTAB, no w/r/r; no belabored breathing or accessory muscle use appreciated   Abd: BS+ in all 4 quadrants; NTND, soft to palpation; no organomegaly appreciated  : testes and penile shaft edematous and tender to palpation, raise rash noted above the pubis (see below)  Extrem: pulses full, equal, and regular over all 4 extremities; no UE or LE edema BL; see images below of right axillae                   Significant Labs: All pertinent labs within the past 24 hours have been reviewed.    Significant Imaging: I have reviewed all pertinent imaging results/findings within the past 24 hours.

## 2023-08-12 NOTE — SUBJECTIVE & OBJECTIVE
Interval History: No new complaints today.    Review of Systems   Skin:  Positive for color change, rash and wound.   All other systems reviewed and are negative.    Objective:     Vital Signs (Most Recent):  Temp: 98.2 °F (36.8 °C) (08/12/23 1104)  Pulse: 102 (08/12/23 1104)  Resp: 18 (08/12/23 1104)  BP: (!) 143/84 (08/12/23 1104)  SpO2: 98 % (08/12/23 1104) Vital Signs (24h Range):  Temp:  [98.2 °F (36.8 °C)-98.6 °F (37 °C)] 98.2 °F (36.8 °C)  Pulse:  [] 102  Resp:  [14-18] 18  SpO2:  [98 %-100 %] 98 %  BP: (132-143)/(72-86) 143/84     Weight: 66.7 kg (147 lb 0.8 oz)  Body mass index is 22.36 kg/m².    Estimated Creatinine Clearance: 141.6 mL/min (based on SCr of 0.7 mg/dL).     Physical Exam  Vitals reviewed.   Constitutional:       General: He is not in acute distress.     Appearance: Normal appearance. He is not ill-appearing.   HENT:      Head: Normocephalic and atraumatic.   Eyes:      Extraocular Movements: Extraocular movements intact.      Conjunctiva/sclera: Conjunctivae normal.   Cardiovascular:      Rate and Rhythm: Normal rate and regular rhythm.      Heart sounds: No murmur heard.  Pulmonary:      Effort: Pulmonary effort is normal. No respiratory distress.      Breath sounds: Normal breath sounds.   Abdominal:      General: Abdomen is flat. Bowel sounds are normal.      Palpations: Abdomen is soft.      Tenderness: There is no abdominal tenderness.   Genitourinary:     Comments: Small flesh colored lesions in L groin and base of penis  Musculoskeletal:      Cervical back: Normal range of motion.      Comments: Numerous flesh colored lesions in R underarm, tender to palpation, no active drainage   Skin:     General: Skin is warm and dry.   Neurological:      General: No focal deficit present.      Mental Status: He is alert and oriented to person, place, and time.   Psychiatric:         Mood and Affect: Mood normal.         Behavior: Behavior normal.         Thought Content: Thought content  normal.          Significant Labs:   Microbiology Results (last 7 days)       Procedure Component Value Units Date/Time    Culture, Anaerobe [[id number]]     Order Status: No result Specimen: Biopsy from Chest, Right     Aerobic culture [[id number]]     Order Status: No result Specimen: Biopsy from Chest, Right     Fungus culture [[id number]]     Order Status: No result Specimen: Biopsy from Chest, Right     AFB Culture & Smear [[id number]]     Order Status: No result Specimen: Biopsy from Chest, Right     C. trachomatis/N. gonorrhoeae by AMP DNA [facility]; Urine [[id number]] Collected: 08/11/23 1422    Order Status: Sent Specimen: Genital Updated: 08/11/23 1443    C. trachomatis/N. gonorrhoeae by AMP DNA [facility]; Urethra [[id number]] Collected: 08/11/23 1413    Order Status: Sent Specimen: Genital Updated: 08/11/23 1413            Significant Imaging: I have reviewed all pertinent imaging results/findings within the past 24 hours.

## 2023-08-12 NOTE — PROGRESS NOTES
[facility]  Infectious Disease  Progress Note    Patient Name: Victorino Fisher  MRN: [id number]  Admission Date: 8/11/2023  Length of Stay: 0 days  Attending Physician: Gwyn Mora MD  Primary Care Provider: No, Primary Doctor    Isolation Status: Contact and Airborne  Assessment/Plan:      ENT  Mouth lesion  Attention was called to a black lesion on the oral mucosa to the right.  Etiology unclear.  EBV mediated disease is possible.  He is a smoker also which would make some form of cancer possible.  Will monitor for now and see if it responds to antibiotics and HAART therapy.    Derm  Rash of groin  Suspect this is same process as axilla lesions.      Renal/  * Bilateral epididymitis  Most common causes in younger people would be gonorrhea and chlamydia.  Patient received ceftriaxone 500 mg IM X 1.  Currently on doxycycline 100 mg po bid.  Will follow up on results of screening for Gonorrhea and Chlamydia.    ID  Syphilis  RPR is positive at 1:4.  Patient is to continue on doxycycline.    HIV (human immunodeficiency virus infection)  CD4 count pending.  If lower than 200, then will have to consider opportunistic infections as a possible cause of his skin lesions.  Patient appears motivated to start HAART therapy.  If his CD4 is below 200, will consider staring HAART while inpatient with instructions to follow up with myself in clinic.    Other  Mass of right axilla  Multiple nodular lesions in his right axilla. Suspect this is same process as in his groin area.   If his CD4 count is below 200, then the differential would include Kaposi sarcoma vs Bacillary angiomatosis.  Other possibilities may include atypical condyloma secondary to HPV or Syphilis.  Atypical hydradenitis suppurativa is possible but seems less likely.  A swab was sent for monkeypox but this also appears unlikely.  There is an odor with some drainage to the lesions in his axilla making me suspicious for some bacterial  "super-infection.      Plan    1. Appreciate dermatology input and biopsy.    2. Okay to continue vancomycin and zosyn for now pending culture results from biopsy.    3. Empiric doxycycline.        Anticipated Disposition: TBD    Thank you for your consult. I will follow-up with patient. Please contact us if you have any additional questions.    Bryan Schafer MD  Infectious Disease  [address] - [facility]    Subjective:     Principal Problem:Bilateral epididymitis    HPI: Mr. Fisher is a 33M with PMH of HIV (CD4 382 in May 2023), presents with L groin pain. Infectious disease consulted for "rash".     Patient states that he was diagnosed with HIV earlier this year, but has not been able to establish care with anyone and has not been on retrovirals. He states the lesions in the R underarm began as a couple small lesions in May, and have since progressed. He states they started "leaking" so he put some tissue/napkin on it. He states it is tender to palpation. He has some lesions in L groin and at the base of the penis as well, with some associated swelling.     Interval History: No new complaints today.    Review of Systems   Skin:  Positive for color change, rash and wound.   All other systems reviewed and are negative.    Objective:     Vital Signs (Most Recent):  Temp: 98.2 °F (36.8 °C) (08/12/23 1104)  Pulse: 102 (08/12/23 1104)  Resp: 18 (08/12/23 1104)  BP: (!) 143/84 (08/12/23 1104)  SpO2: 98 % (08/12/23 1104) Vital Signs (24h Range):  Temp:  [98.2 °F (36.8 °C)-98.6 °F (37 °C)] 98.2 °F (36.8 °C)  Pulse:  [] 102  Resp:  [14-18] 18  SpO2:  [98 %-100 %] 98 %  BP: (132-143)/(72-86) 143/84     Weight: 66.7 kg (147 lb 0.8 oz)  Body mass index is 22.36 kg/m².    Estimated Creatinine Clearance: 141.6 mL/min (based on SCr of 0.7 mg/dL).     Physical Exam  Vitals reviewed.   Constitutional:       General: He is not in acute distress.     Appearance: Normal appearance. He is not ill-appearing.   HENT:      Head: " Normocephalic and atraumatic.   Eyes:      Extraocular Movements: Extraocular movements intact.      Conjunctiva/sclera: Conjunctivae normal.   Cardiovascular:      Rate and Rhythm: Normal rate and regular rhythm.      Heart sounds: No murmur heard.  Pulmonary:      Effort: Pulmonary effort is normal. No respiratory distress.      Breath sounds: Normal breath sounds.   Abdominal:      General: Abdomen is flat. Bowel sounds are normal.      Palpations: Abdomen is soft.      Tenderness: There is no abdominal tenderness.   Genitourinary:     Comments: Small flesh colored lesions in L groin and base of penis  Musculoskeletal:      Cervical back: Normal range of motion.      Comments: Numerous flesh colored lesions in R underarm, tender to palpation, no active drainage   Skin:     General: Skin is warm and dry.   Neurological:      General: No focal deficit present.      Mental Status: He is alert and oriented to person, place, and time.   Psychiatric:         Mood and Affect: Mood normal.         Behavior: Behavior normal.         Thought Content: Thought content normal.          Significant Labs:   Microbiology Results (last 7 days)       Procedure Component Value Units Date/Time    Culture, Anaerobe [[id number]]     Order Status: No result Specimen: Biopsy from Chest, Right     Aerobic culture [[id number]]     Order Status: No result Specimen: Biopsy from Chest, Right     Fungus culture [[id number]]     Order Status: No result Specimen: Biopsy from Chest, Right     AFB Culture & Smear [[id number]]     Order Status: No result Specimen: Biopsy from Chest, Right     C. trachomatis/N. gonorrhoeae by AMP DNA [facility]; Urine [[id number]] Collected: 08/11/23 1422    Order Status: Sent Specimen: Genital Updated: 08/11/23 1443    C. trachomatis/N. gonorrhoeae by AMP DNA [facility]; Urethra [[id number]] Collected: 08/11/23 1413    Order Status: Sent Specimen: Genital Updated: 08/11/23 1413            Significant Imaging: I have reviewed  all pertinent imaging results/findings within the past 24 hours.

## 2023-08-12 NOTE — ASSESSMENT & PLAN NOTE
Multiple nodular lesions in his right axilla. Suspect this is same process as in his groin area.   If his CD4 count is below 200, then the differential would include Kaposi sarcoma vs Bacillary angiomatosis.  Other possibilities may include atypical condyloma secondary to HPV or Syphilis.  Atypical hydradenitis suppurativa is possible but seems less likely.  A swab was sent for monkeypox but this also appears unlikely.  There is an odor with some drainage to the lesions in his axilla making me suspicious for some bacterial super-infection.      Plan    1. Appreciate dermatology input and biopsy.    2. Okay to continue vancomycin and zosyn for now pending culture results from biopsy.    3. Empiric doxycycline.

## 2023-08-12 NOTE — ASSESSMENT & PLAN NOTE
CD4 count pending.  If lower than 200, then will have to consider opportunistic infections as a possible cause of his skin lesions.  Patient appears motivated to start HAART therapy.  If his CD4 is below 200, will consider staring HAART while inpatient with instructions to follow up with myself in clinic.

## 2023-08-13 LAB
ALBUMIN SERPL BCP-MCNC: 2.9 G/DL (ref 3.5–5.2)
ALP SERPL-CCNC: 76 U/L (ref 55–135)
ALT SERPL W/O P-5'-P-CCNC: 23 U/L (ref 10–44)
ANION GAP SERPL CALC-SCNC: 9 MMOL/L (ref 8–16)
AST SERPL-CCNC: 37 U/L (ref 10–40)
BASOPHILS # BLD AUTO: 0.04 K/UL (ref 0–0.2)
BASOPHILS NFR BLD: 0.5 % (ref 0–1.9)
BILIRUB SERPL-MCNC: 0.5 MG/DL (ref 0.1–1)
BUN SERPL-MCNC: 12 MG/DL (ref 6–20)
CALCIUM SERPL-MCNC: 8.9 MG/DL (ref 8.7–10.5)
CHLORIDE SERPL-SCNC: 98 MMOL/L (ref 95–110)
CO2 SERPL-SCNC: 27 MMOL/L (ref 23–29)
CREAT SERPL-MCNC: 0.8 MG/DL (ref 0.5–1.4)
DIFFERENTIAL METHOD: ABNORMAL
EOSINOPHIL # BLD AUTO: 0.1 K/UL (ref 0–0.5)
EOSINOPHIL NFR BLD: 1.1 % (ref 0–8)
ERYTHROCYTE [DISTWIDTH] IN BLOOD BY AUTOMATED COUNT: 13.7 % (ref 11.5–14.5)
EST. GFR  (NO RACE VARIABLE): >60 ML/MIN/1.73 M^2
GLUCOSE SERPL-MCNC: 126 MG/DL (ref 70–110)
HCT VFR BLD AUTO: 30.1 % (ref 40–54)
HGB BLD-MCNC: 9.3 G/DL (ref 14–18)
IMM GRANULOCYTES # BLD AUTO: 0.03 K/UL (ref 0–0.04)
IMM GRANULOCYTES NFR BLD AUTO: 0.4 % (ref 0–0.5)
LYMPHOCYTES # BLD AUTO: 2.1 K/UL (ref 1–4.8)
LYMPHOCYTES NFR BLD: 26.6 % (ref 18–48)
MAGNESIUM SERPL-MCNC: 1.8 MG/DL (ref 1.6–2.6)
MCH RBC QN AUTO: 27.4 PG (ref 27–31)
MCHC RBC AUTO-ENTMCNC: 30.9 G/DL (ref 32–36)
MCV RBC AUTO: 89 FL (ref 82–98)
MONOCYTES # BLD AUTO: 0.4 K/UL (ref 0.3–1)
MONOCYTES NFR BLD: 5 % (ref 4–15)
NEUTROPHILS # BLD AUTO: 5.3 K/UL (ref 1.8–7.7)
NEUTROPHILS NFR BLD: 66.4 % (ref 38–73)
NRBC BLD-RTO: 0 /100 WBC
PHOSPHATE SERPL-MCNC: 4.3 MG/DL (ref 2.7–4.5)
PLATELET # BLD AUTO: 66 K/UL (ref 150–450)
PMV BLD AUTO: 12 FL (ref 9.2–12.9)
POTASSIUM SERPL-SCNC: 4.3 MMOL/L (ref 3.5–5.1)
PROT SERPL-MCNC: 6.4 G/DL (ref 6–8.4)
RBC # BLD AUTO: 3.4 M/UL (ref 4.6–6.2)
SODIUM SERPL-SCNC: 134 MMOL/L (ref 136–145)
VANCOMYCIN TROUGH SERPL-MCNC: 9.6 UG/ML (ref 10–22)
WBC # BLD AUTO: 7.94 K/UL (ref 3.9–12.7)

## 2023-08-13 PROCEDURE — 11000001 HC ACUTE MED/SURG PRIVATE ROOM

## 2023-08-13 PROCEDURE — 85025 COMPLETE CBC W/AUTO DIFF WBC: CPT | Performed by: STUDENT IN AN ORGANIZED HEALTH CARE EDUCATION/TRAINING PROGRAM

## 2023-08-13 PROCEDURE — 80053 COMPREHEN METABOLIC PANEL: CPT | Performed by: STUDENT IN AN ORGANIZED HEALTH CARE EDUCATION/TRAINING PROGRAM

## 2023-08-13 PROCEDURE — A4216 STERILE WATER/SALINE, 10 ML: HCPCS | Performed by: STUDENT IN AN ORGANIZED HEALTH CARE EDUCATION/TRAINING PROGRAM

## 2023-08-13 PROCEDURE — 25000003 PHARM REV CODE 250: Performed by: STUDENT IN AN ORGANIZED HEALTH CARE EDUCATION/TRAINING PROGRAM

## 2023-08-13 PROCEDURE — 99233 PR SUBSEQUENT HOSPITAL CARE,LEVL III: ICD-10-PCS | Mod: ,,, | Performed by: STUDENT IN AN ORGANIZED HEALTH CARE EDUCATION/TRAINING PROGRAM

## 2023-08-13 PROCEDURE — 80202 ASSAY OF VANCOMYCIN: CPT | Performed by: STUDENT IN AN ORGANIZED HEALTH CARE EDUCATION/TRAINING PROGRAM

## 2023-08-13 PROCEDURE — 99233 SBSQ HOSP IP/OBS HIGH 50: CPT | Mod: ,,, | Performed by: INTERNAL MEDICINE

## 2023-08-13 PROCEDURE — 84100 ASSAY OF PHOSPHORUS: CPT | Performed by: STUDENT IN AN ORGANIZED HEALTH CARE EDUCATION/TRAINING PROGRAM

## 2023-08-13 PROCEDURE — 63600175 PHARM REV CODE 636 W HCPCS: Performed by: STUDENT IN AN ORGANIZED HEALTH CARE EDUCATION/TRAINING PROGRAM

## 2023-08-13 PROCEDURE — G0378 HOSPITAL OBSERVATION PER HR: HCPCS

## 2023-08-13 PROCEDURE — 99233 PR SUBSEQUENT HOSPITAL CARE,LEVL III: ICD-10-PCS | Mod: ,,, | Performed by: INTERNAL MEDICINE

## 2023-08-13 PROCEDURE — 83735 ASSAY OF MAGNESIUM: CPT | Performed by: STUDENT IN AN ORGANIZED HEALTH CARE EDUCATION/TRAINING PROGRAM

## 2023-08-13 PROCEDURE — 25000003 PHARM REV CODE 250: Performed by: INTERNAL MEDICINE

## 2023-08-13 PROCEDURE — 86361 T CELL ABSOLUTE COUNT: CPT | Performed by: INTERNAL MEDICINE

## 2023-08-13 PROCEDURE — 36415 COLL VENOUS BLD VENIPUNCTURE: CPT | Performed by: STUDENT IN AN ORGANIZED HEALTH CARE EDUCATION/TRAINING PROGRAM

## 2023-08-13 PROCEDURE — 99233 SBSQ HOSP IP/OBS HIGH 50: CPT | Mod: ,,, | Performed by: STUDENT IN AN ORGANIZED HEALTH CARE EDUCATION/TRAINING PROGRAM

## 2023-08-13 RX ORDER — OXYCODONE AND ACETAMINOPHEN 10; 325 MG/1; MG/1
1 TABLET ORAL EVERY 4 HOURS PRN
Status: DISCONTINUED | OUTPATIENT
Start: 2023-08-13 | End: 2023-08-16 | Stop reason: HOSPADM

## 2023-08-13 RX ADMIN — HYDROCODONE BITARTRATE AND ACETAMINOPHEN 1 TABLET: 10; 325 TABLET ORAL at 06:08

## 2023-08-13 RX ADMIN — OXYCODONE AND ACETAMINOPHEN 1 TABLET: 10; 325 TABLET ORAL at 12:08

## 2023-08-13 RX ADMIN — VANCOMYCIN HYDROCHLORIDE 1000 MG: 1 INJECTION, POWDER, LYOPHILIZED, FOR SOLUTION INTRAVENOUS at 04:08

## 2023-08-13 RX ADMIN — OXYCODONE AND ACETAMINOPHEN 1 TABLET: 10; 325 TABLET ORAL at 04:08

## 2023-08-13 RX ADMIN — Medication 10 ML: at 10:08

## 2023-08-13 RX ADMIN — Medication 10 ML: at 02:08

## 2023-08-13 RX ADMIN — OXYCODONE AND ACETAMINOPHEN 1 TABLET: 10; 325 TABLET ORAL at 08:08

## 2023-08-13 RX ADMIN — Medication 10 ML: at 06:08

## 2023-08-13 RX ADMIN — DOXYCYCLINE HYCLATE 100 MG: 100 TABLET, COATED ORAL at 09:08

## 2023-08-13 RX ADMIN — PIPERACILLIN SODIUM AND TAZOBACTAM SODIUM 4.5 G: 4; .5 INJECTION, POWDER, FOR SOLUTION INTRAVENOUS at 06:08

## 2023-08-13 RX ADMIN — PIPERACILLIN SODIUM AND TAZOBACTAM SODIUM 4.5 G: 4; .5 INJECTION, POWDER, FOR SOLUTION INTRAVENOUS at 12:08

## 2023-08-13 RX ADMIN — PIPERACILLIN SODIUM AND TAZOBACTAM SODIUM 4.5 G: 4; .5 INJECTION, POWDER, FOR SOLUTION INTRAVENOUS at 02:08

## 2023-08-13 NOTE — PROGRESS NOTES
"[facility] [address] [place] [address] Medicine  Progress Note    Patient Name: Victorino Fisher  MRN: [id number]  Patient Class: OP- Observation   Admission Date: 8/11/2023  Length of Stay: 0 days  Attending Physician: Gwyn Mora MD  Primary Care Provider: [place], Primary Doctor        Subjective:     Principal Problem:Bilateral epididymitis        HPI:  Victorino Fisher is a 32yo M with a PMH of HIV (not on HAART) and tobacco abuse who presented to the [facility] ED on 8/11/23 with complaints of scrotal pain and pain in his right axilla. Pt reports an "ingrown hair" in his right armpit 3 weeks prior, which has allegedly enlarged into a mass over this short time pain; associated with occasional purulent discharge and pain with ROM. States he has not thought much of it. Also with 2-3 day history of BL testicular pain and scrotal swelling. Denies F/C/N/V/D/C, HA, SOB, CP, palpitations, abdominal pain, dysuria, extremity swelling, or symptoms otherwise.    In the ED, VSS. Labs remarkable for Hb 10.8, though were otherwise grossly asymptomatic. Scrotal US showed sonographic findings suggestive of bilateral epididymitis with scrotal wall thickening, bilateral groin masses measuring up to 3.4 cm which likely represent pathologic lymphadenopathy, bilateral epididymal cysts, punctate left testicular cystic focus too small to characterize, and bilateral varicoceles. ED gave doxy, rocephin, and vanc and consulted ID and derm. Hospital medicine was consulted for admission and further management.      Overview/Hospital Course:  Pt admitted to [facility] and started on empiric vanc and zosyn; doxy added per ID recs. Dermatology consulted for biopsy of right axillary lesion; path pending. RPR reactive, consistent with active syphilis, on doxy per ID.       Interval History: Pt seen and examined this morning on aiden DANIELS. Pain worse today after biopsy, discussed escalating to percocet. Also reviewed positive syphilis testing and counseled on " safe sex practices. Care plan reviewed. Otherwise, doing well and with no further complaints at this time.      Objective:     Vital Signs (Most Recent):  Temp: 99.1 °F (37.3 °C) (08/13/23 0818)  Pulse: 96 (08/13/23 0818)  Resp: 16 (08/13/23 0818)  BP: 139/78 (08/13/23 0818)  SpO2: 97 % (08/13/23 0818) Vital Signs (24h Range):  Temp:  [98.2 °F (36.8 °C)-99.1 °F (37.3 °C)] 99.1 °F (37.3 °C)  Pulse:  [] 96  Resp:  [16-20] 16  SpO2:  [97 %-100 %] 97 %  BP: (124-143)/(69-88) 139/78     Weight: 66.7 kg (147 lb 0.8 oz)  Body mass index is 22.36 kg/m².    Intake/Output Summary (Last 24 hours) at 8/13/2023 1013  Last data filed at 8/13/2023 0925  Gross per 24 hour   Intake 700 ml   Output 2250 ml   Net -1550 ml         Physical Exam  Gen: in NAD, appears stated age; generalized LAD  Neuro: AAOx4, CN2-12 grossly intact BL; motor, sensory, and strength grossly intact BL  HEENT: NTNC, EOMI, PERRLA, MMM; no thyromegaly or lymphadenopathy; no JVD appreciated  CVS: RRR, no m/r/g; S1/S2 auscultated with no S3 or S4; capillary refill < 2 sec  Resp: lungs CTAB, no w/r/r; no belabored breathing or accessory muscle use appreciated   Abd: BS+ in all 4 quadrants; NTND, soft to palpation; no organomegaly appreciated  : testes and penile shaft edematous and tender to palpation, raise rash noted above the pubis (see below)  Extrem: pulses full, equal, and regular over all 4 extremities; no UE or LE edema BL; see images below of right axillae               Significant Labs: All pertinent labs within the past 24 hours have been reviewed.    Significant Imaging: I have reviewed all pertinent imaging results/findings within the past 24 hours.      Assessment/Plan:      * Bilateral epididymitis  - Interval history and physical exam findings as described above  - Scrotal US findings reviewed  - Infectious workup pending  - On vanc, zosyn, and doxy per ID  - Afebrile, no leukocytosis  - ID following  - PRN analgesics provided  -  Continuing to monitor    Mass of right axilla  - Interval history and physical exam findings as described above  - Suspect this has been developing longer than the 3 weeks he reported  - Infectious workup pending  - On vanc and zosyn  - S/p punch bx with derm on 8/11, path pending  - ID following    Rash of groin  - Interval history and physical exam findings as described above  - Infectious workup pending  - On vanc and zosyn  - Derm and ID following    Diffuse lymphadenopathy  - Likely related to infectious source in the setting of untreated HIV    HIV (human immunodeficiency virus infection)  - Noncompliant with HAART  - CD4 count and HIV viral load pending  - ID following    Syphilis  - RPR 1:4  - Continue doxy per ID    Mouth lesion  - Noted on derm eval  - Will need outpatient ENT referral    Normocytic anemia  - H/H lower than previous on admission  - Will continue to monitor on daily labs    Tobacco abuse  - Pt with 0.5 ppd smoking history for most of adult life  - Currently without plans to quit  - Counseled on the importance of smoking cessation in relation to health     Tobacco abuse counseling  - As above      VTE Risk Mitigation (From admission, onward)         Ordered     Place sequential compression device  Until discontinued         08/11/23 1555     IP VTE LOW RISK PATIENT  Once         08/11/23 1555     Place sequential compression device  Until discontinued         08/11/23 1555                Discharge Planning   MAX: 8/15/2023     Code Status: Full Code   Is the patient medically ready for discharge?: No    Reason for patient still in hospital (select all that apply): Patient trending condition               Gwyn Mora MD  Attending Physician  Medical Director - [facility]  Department of Hospital Medicine  8/13/2023

## 2023-08-13 NOTE — PROGRESS NOTES
[facility]  Infectious Disease  Progress Note    Patient Name: Victorino Fisher  MRN: [id number]  Admission Date: 8/11/2023  Length of Stay: 0 days  Attending Physician: Gwyn Mora MD  Primary Care Provider: No, Primary Doctor    Isolation Status: Contact and Airborne  Assessment/Plan:      ENT  Mouth lesion  Attention was called to a black lesion on the oral mucosa to the right.  Etiology unclear.  EBV mediated disease is possible.  He is a smoker also which would make some form of cancer possible.      Plan    1. Consider ENT consult for possible biopsy.    Derm  Rash of groin  Suspect this is same process as axilla lesions.      Renal/  * Bilateral epididymitis  Most common causes in younger people would be gonorrhea and chlamydia.  Gonorrhea was positive.  He received a dose of IM ceftriaxone in the ED.    ID  Syphilis  RPR is positive at 1:4.  Patient is to continue on doxycycline.    HIV (human immunodeficiency virus infection)  CD4 count pending.  Patient appears ready and motivated to start HIV treatment.  Will plan to start patient on therapy soon and follow him up in my clinic.    Other  Mass of right axilla  Multiple nodular lesions in his right axilla. Suspect this is same process as in his groin area.   If his CD4 count is below 200, then the differential would include Kaposi sarcoma vs Bacillary angiomatosis.  Other possibilities may include atypical condyloma secondary to HPV or Syphilis.  Atypical hydradenitis suppurativa is possible but seems less likely.  A swab was sent for monkeypox but this also appears unlikely.  There is an odor with some drainage to the lesions in his axilla making me suspicious for some bacterial super-infection.      Plan    1. Appreciate dermatology input and biopsy.    2. Okay to continue vancomycin and zosyn for now pending culture results from biopsy.    3. Empiric doxycycline.        Anticipated Disposition: No adverse events    Thank you for  "your consult. I will follow-up with patient. Please contact us if you have any additional questions.    Bryan Schafer MD  Infectious Disease  [facility]    Subjective:     Principal Problem:Bilateral epididymitis    HPI: Mr. Fisher is a 33M with PMH of HIV (CD4 382 in May 2023), presents with L groin pain. Infectious disease consulted for "rash".     Patient states that he was diagnosed with HIV earlier this year, but has not been able to establish care with anyone and has not been on retrovirals. He states the lesions in the R underarm began as a couple small lesions in May, and have since progressed. He states they started "leaking" so he put some tissue/napkin on it. He states it is tender to palpation. He has some lesions in L groin and at the base of the penis as well, with some associated swelling.     Interval History: Reports that he is feeling better.    Review of Systems   Skin:  Positive for color change, rash and wound.   All other systems reviewed and are negative.    Objective:     Vital Signs (Most Recent):  Temp: 98.2 °F (36.8 °C) (08/13/23 1522)  Pulse: 92 (08/13/23 1522)  Resp: 18 (08/13/23 1649)  BP: 123/80 (08/13/23 1522)  SpO2: 100 % (08/13/23 1522) Vital Signs (24h Range):  Temp:  [98.1 °F (36.7 °C)-99.1 °F (37.3 °C)] 98.2 °F (36.8 °C)  Pulse:  [88-96] 92  Resp:  [16-20] 18  SpO2:  [97 %-100 %] 100 %  BP: (123-142)/(71-88) 123/80     Weight: 66.7 kg (147 lb 0.8 oz)  Body mass index is 22.36 kg/m².    Estimated Creatinine Clearance: 123.9 mL/min (based on SCr of 0.8 mg/dL).     Physical Exam  Vitals reviewed.   Constitutional:       General: He is not in acute distress.     Appearance: Normal appearance. He is not ill-appearing.   HENT:      Head: Normocephalic and atraumatic.   Eyes:      Extraocular Movements: Extraocular movements intact.      Conjunctiva/sclera: Conjunctivae normal.   Cardiovascular:      Rate and Rhythm: Normal rate and regular rhythm.      Heart sounds: No " murmur heard.  Pulmonary:      Effort: Pulmonary effort is normal. No respiratory distress.      Breath sounds: Normal breath sounds.   Abdominal:      General: Abdomen is flat. Bowel sounds are normal.      Palpations: Abdomen is soft.      Tenderness: There is no abdominal tenderness.   Genitourinary:     Comments: Small flesh colored lesions in L groin and base of penis  Musculoskeletal:      Cervical back: Normal range of motion.      Comments: Numerous flesh colored lesions in R underarm, tender to palpation, no active drainage   Skin:     General: Skin is warm and dry.   Neurological:      General: No focal deficit present.      Mental Status: He is alert and oriented to person, place, and time.   Psychiatric:         Mood and Affect: Mood normal.         Behavior: Behavior normal.         Thought Content: Thought content normal.          Significant Labs:   Microbiology Results (last 7 days)       Procedure Component Value Units Date/Time    Aerobic culture [[id number]] Collected: 08/12/23 1616    Order Status: Completed Specimen: Biopsy from Chest, Right Updated: 08/13/23 0924     Aerobic Bacterial Culture No significant isolate to date    Fungus culture [[id number]] Collected: 08/12/23 1616    Order Status: Sent Specimen: Biopsy from Chest, Right Updated: 08/12/23 1654    AFB Culture & Smear [[id number]] Collected: 08/12/23 1616    Order Status: Sent Specimen: Biopsy from Chest, Right Updated: 08/12/23 1654    Culture, Anaerobe [[id number]] Collected: 08/12/23 1616    Order Status: Sent Specimen: Biopsy from Chest, Right Updated: 08/12/23 1651    C. trachomatis/N. gonorrhoeae by AMP DNA [facility]; Urine [[id number]]  (Abnormal) Collected: 08/11/23 1422    Order Status: Completed Specimen: Genital Updated: 08/12/23 1622     Chlamydia, Amplified DNA Not Detected     N gonorrhoeae, amplified DNA Detected    Narrative:      Sources by Resulting Lab:->[facility]  Release to patient->Immediate    C. trachomatis/N.  gonorrhoeae by AMP DNA [facility]; Urethra [[id number]] Collected: 08/11/23 1413    Order Status: Sent Specimen: Genital Updated: 08/11/23 1413            Significant Imaging: I have reviewed all pertinent imaging results/findings within the past 24 hours.

## 2023-08-13 NOTE — ASSESSMENT & PLAN NOTE
- Interval history and physical exam findings as described above  - Infectious workup pending  - On vanc and zosyn  - Derm and ID following

## 2023-08-13 NOTE — ASSESSMENT & PLAN NOTE
Most common causes in younger people would be gonorrhea and chlamydia.  Gonorrhea was positive.  He received a dose of IM ceftriaxone in the ED.

## 2023-08-13 NOTE — ASSESSMENT & PLAN NOTE
Patient with untreated HIV and 3+month history of painful nodular eruption to R axilla and groin. DDx: Botryomycosis vs Bacillary Angiomatosis vs Kapsoi Sarcoma vs other infectious etiology.   -will obtain shave biopsy x2 large nodules from R axilla as below; one for H&E and one for wound cultures (aerobic, anaerobic, fungal, and AFB cultures ordered)  -ABX treatment per primary and ID  -can consider ENT evaluation of plaque in mouth outpatient if concern for leukoplakia or if lesion doesn't resolve with abx & antiretroviral therapy  -for biopsy site wound care: recommend leaving pressure dressing on biopsy site for 24 hours, then apply vaseline and new band-aid daily for 2 weeks    Shave biopsy procedure note:    Shave biopsy performed after verbal consent including risk of infection, scar, recurrence, need for additional treatment of site. Areas prepped with alcohol, anesthetized with approximately 5.0cc of 1% lidocaine with epinephrine. Lesional tissue shaved with razor blade. Hemostasis achieved with application of aluminum chloride followed by hyfrecation. Dressing applied. Wound care explained.

## 2023-08-13 NOTE — PLAN OF CARE
Problem: Adult Inpatient Plan of Care  Goal: Plan of Care Review  Outcome: Ongoing, Progressing     Problem: Infection  Goal: Absence of Infection Signs and Symptoms  Outcome: Ongoing, Progressing     Problem: Pain Acute  Goal: Acceptable Pain Control and Functional Ability  Outcome: Ongoing, Progressing     Problem: Skin or Soft Tissue Infection  Goal: Absence of Infection Signs and Symptoms  Outcome: Ongoing, Progressing

## 2023-08-13 NOTE — PROGRESS NOTES
Pharmacokinetic Assessment Follow Up: IV Vancomycin    Vancomycin serum concentration assessment(s):    The trough level was drawn correctly and can be used to guide therapy at this time. The measurement is below the desired definitive target range of 10 to 20 mcg/mL.    Vancomycin Regimen Plan:    -Continue 1000 mg every 12 hours  -Next trough scheduled for 8/15/23 0400 prior to 4th dose    Drug levels (last 3 results):  Recent Labs   Lab Result Units 08/13/23  0957   Vancomycin-Trough ug/mL 9.6*       Pharmacy will continue to follow and monitor vancomycin.    Please contact pharmacy at extension [phone] for questions regarding this assessment.    Thank you for the consult,   Ga Villeda       Patient brief summary:  Victorino Fisher is a 33 y.o. male initiated on antimicrobial therapy with IV Vancomycin for treatment of skin & soft tissue infection        Drug Allergies:   Review of patient's allergies indicates:  No Known Allergies    Actual Body Weight:   66.7 kg    Renal Function:   Estimated Creatinine Clearance: 123.9 mL/min (based on SCr of 0.8 mg/dL).,         CBC (last 72 hours):  Recent Labs   Lab Result Units 08/11/23  1054 08/12/23  0520 08/13/23  0957   WBC K/uL 7.80 7.51 7.94   Hemoglobin g/dL 10.8* 9.1* 9.3*   Hematocrit % 35.0* 28.8* 30.1*   Platelets K/uL 76* 63* 66*   Gran % % 63.8 57.6 66.4   Lymph % % 29.2 33.0 26.6   Mono % % 4.6 6.7 5.0   Eosinophil % % 1.3 2.0 1.1   Basophil % % 0.6 0.3 0.5   Differential Method  Automated Automated Automated       Metabolic Panel (last 72 hours):  Recent Labs   Lab Result Units 08/11/23  1054 08/11/23  1413 08/12/23  0520 08/13/23  0957   Sodium mmol/L 138  --  134* 134*   Potassium mmol/L 4.0  --  4.1 4.3   Chloride mmol/L 102  --  101 98   CO2 mmol/L 26  --  21* 27   Glucose mg/dL 88  --  101 126*   Glucose, UA   --  Negative  --   --    BUN mg/dL 11  --  11 12   Creatinine mg/dL 0.8  --  0.7 0.8   Albumin g/dL 3.5  --  2.8* 2.9*   Total Bilirubin  mg/dL 0.6  --  0.5 0.5   Alkaline Phosphatase U/L 74  --  65 76   AST U/L 25  --  27 37   ALT U/L 15  --  11 23   Magnesium mg/dL  --   --  1.6 1.8   Phosphorus mg/dL  --   --  3.9 4.3       Vancomycin Administrations:  vancomycin given in the last 96 hours                     vancomycin (VANCOCIN) 1,000 mg in dextrose 5 % (D5W) 250 mL IVPB (Vial-Mate) (mg) 1,000 mg New Bag 08/12/23 2126     1,000 mg New Bag  0853    vancomycin (VANCOCIN) 500 mg in dextrose 5 % in water (D5W) 100 mL IVPB (MB+) (mg) 500 mg New Bag 08/11/23 1735    vancomycin (VANCOCIN) 1,000 mg in dextrose 5 % (D5W) 250 mL IVPB (Vial-Mate) (mg) 1,000 mg New Bag 08/11/23 1607                    Microbiologic Results:  Microbiology Results (last 7 days)       Procedure Component Value Units Date/Time    Aerobic culture [[id number]] Collected: 08/12/23 1616    Order Status: Completed Specimen: Biopsy from Chest, Right Updated: 08/13/23 0924     Aerobic Bacterial Culture No significant isolate to date    Fungus culture [[id number]] Collected: 08/12/23 1616    Order Status: Sent Specimen: Biopsy from Chest, Right Updated: 08/12/23 1654    AFB Culture & Smear [[id number]] Collected: 08/12/23 1616    Order Status: Sent Specimen: Biopsy from Chest, Right Updated: 08/12/23 1654    Culture, Anaerobe [[id number]] Collected: 08/12/23 1616    Order Status: Sent Specimen: Biopsy from Chest, Right Updated: 08/12/23 1651    C. trachomatis/N. gonorrhoeae by AMP DNA [facility]; Urine [[id number]]  (Abnormal) Collected: 08/11/23 1422    Order Status: Completed Specimen: Genital Updated: 08/12/23 1622     Chlamydia, Amplified DNA Not Detected     N gonorrhoeae, amplified DNA Detected    Narrative:      Sources by Resulting Lab:->[facility]  Release to patient->Immediate    C. trachomatis/N. gonorrhoeae by AMP DNA [facility]; Urethra [[id number]] Collected: 08/11/23 1413    Order Status: Sent Specimen: Genital Updated: 08/11/23 1413

## 2023-08-13 NOTE — ASSESSMENT & PLAN NOTE
Attention was called to a black lesion on the oral mucosa to the right.  Etiology unclear.  EBV mediated disease is possible.  He is a smoker also which would make some form of cancer possible.      Plan    1. Consider ENT consult for possible biopsy.

## 2023-08-13 NOTE — ASSESSMENT & PLAN NOTE
- Interval history and physical exam findings as described above  - Scrotal US findings reviewed  - Infectious workup pending  - On vanc, zosyn, and doxy per ID  - Afebrile, no leukocytosis  - ID following  - PRN analgesics provided  - Continuing to monitor

## 2023-08-13 NOTE — SUBJECTIVE & OBJECTIVE
Interval History: Pt seen and examined this morning on aiden DANIELS. Pain worse today after biopsy, discussed escalating to percocet. Also reviewed positive syphilis testing and counseled on safe sex practices. Care plan reviewed. Otherwise, doing well and with no further complaints at this time.      Objective:     Vital Signs (Most Recent):  Temp: 99.1 °F (37.3 °C) (08/13/23 0818)  Pulse: 96 (08/13/23 0818)  Resp: 16 (08/13/23 0818)  BP: 139/78 (08/13/23 0818)  SpO2: 97 % (08/13/23 0818) Vital Signs (24h Range):  Temp:  [98.2 °F (36.8 °C)-99.1 °F (37.3 °C)] 99.1 °F (37.3 °C)  Pulse:  [] 96  Resp:  [16-20] 16  SpO2:  [97 %-100 %] 97 %  BP: (124-143)/(69-88) 139/78     Weight: 66.7 kg (147 lb 0.8 oz)  Body mass index is 22.36 kg/m².    Intake/Output Summary (Last 24 hours) at 8/13/2023 1013  Last data filed at 8/13/2023 0925  Gross per 24 hour   Intake 700 ml   Output 2250 ml   Net -1550 ml         Physical Exam  Gen: in NAD, appears stated age; generalized LAD  Neuro: AAOx4, CN2-12 grossly intact BL; motor, sensory, and strength grossly intact BL  HEENT: NTNC, EOMI, PERRLA, MMM; no thyromegaly or lymphadenopathy; no JVD appreciated  CVS: RRR, no m/r/g; S1/S2 auscultated with no S3 or S4; capillary refill < 2 sec  Resp: lungs CTAB, no w/r/r; no belabored breathing or accessory muscle use appreciated   Abd: BS+ in all 4 quadrants; NTND, soft to palpation; no organomegaly appreciated  : testes and penile shaft edematous and tender to palpation, raise rash noted above the pubis (see below)  Extrem: pulses full, equal, and regular over all 4 extremities; no UE or LE edema BL; see images below of right axillae               Significant Labs: All pertinent labs within the past 24 hours have been reviewed.    Significant Imaging: I have reviewed all pertinent imaging results/findings within the past 24 hours.

## 2023-08-13 NOTE — SUBJECTIVE & OBJECTIVE
Interval History: Reports that he is feeling better.    Review of Systems   Skin:  Positive for color change, rash and wound.   All other systems reviewed and are negative.    Objective:     Vital Signs (Most Recent):  Temp: 98.2 °F (36.8 °C) (08/13/23 1522)  Pulse: 92 (08/13/23 1522)  Resp: 18 (08/13/23 1649)  BP: 123/80 (08/13/23 1522)  SpO2: 100 % (08/13/23 1522) Vital Signs (24h Range):  Temp:  [98.1 °F (36.7 °C)-99.1 °F (37.3 °C)] 98.2 °F (36.8 °C)  Pulse:  [88-96] 92  Resp:  [16-20] 18  SpO2:  [97 %-100 %] 100 %  BP: (123-142)/(71-88) 123/80     Weight: 66.7 kg (147 lb 0.8 oz)  Body mass index is 22.36 kg/m².    Estimated Creatinine Clearance: 123.9 mL/min (based on SCr of 0.8 mg/dL).     Physical Exam  Vitals reviewed.   Constitutional:       General: He is not in acute distress.     Appearance: Normal appearance. He is not ill-appearing.   HENT:      Head: Normocephalic and atraumatic.   Eyes:      Extraocular Movements: Extraocular movements intact.      Conjunctiva/sclera: Conjunctivae normal.   Cardiovascular:      Rate and Rhythm: Normal rate and regular rhythm.      Heart sounds: No murmur heard.  Pulmonary:      Effort: Pulmonary effort is normal. No respiratory distress.      Breath sounds: Normal breath sounds.   Abdominal:      General: Abdomen is flat. Bowel sounds are normal.      Palpations: Abdomen is soft.      Tenderness: There is no abdominal tenderness.   Genitourinary:     Comments: Small flesh colored lesions in L groin and base of penis  Musculoskeletal:      Cervical back: Normal range of motion.      Comments: Numerous flesh colored lesions in R underarm, tender to palpation, no active drainage   Skin:     General: Skin is warm and dry.   Neurological:      General: No focal deficit present.      Mental Status: He is alert and oriented to person, place, and time.   Psychiatric:         Mood and Affect: Mood normal.         Behavior: Behavior normal.         Thought Content: Thought  content normal.          Significant Labs:   Microbiology Results (last 7 days)       Procedure Component Value Units Date/Time    Aerobic culture [[id number]] Collected: 08/12/23 1616    Order Status: Completed Specimen: Biopsy from Chest, Right Updated: 08/13/23 0924     Aerobic Bacterial Culture No significant isolate to date    Fungus culture [[id number]] Collected: 08/12/23 1616    Order Status: Sent Specimen: Biopsy from Chest, Right Updated: 08/12/23 1654    AFB Culture & Smear [[id number]] Collected: 08/12/23 1616    Order Status: Sent Specimen: Biopsy from Chest, Right Updated: 08/12/23 1654    Culture, Anaerobe [[id number]] Collected: 08/12/23 1616    Order Status: Sent Specimen: Biopsy from Chest, Right Updated: 08/12/23 1651    C. trachomatis/N. gonorrhoeae by AMP DNA [facility]; Urine [[id number]]  (Abnormal) Collected: 08/11/23 1422    Order Status: Completed Specimen: Genital Updated: 08/12/23 1622     Chlamydia, Amplified DNA Not Detected     N gonorrhoeae, amplified DNA Detected    Narrative:      Sources by Resulting Lab:->[facility]  Release to patient->Immediate    C. trachomatis/N. gonorrhoeae by AMP DNA [facility]; Urethra [[id number]] Collected: 08/11/23 1413    Order Status: Sent Specimen: Genital Updated: 08/11/23 1413            Significant Imaging: I have reviewed all pertinent imaging results/findings within the past 24 hours.

## 2023-08-13 NOTE — ASSESSMENT & PLAN NOTE
CD4 count pending.  Patient appears ready and motivated to start HIV treatment.  Will plan to start patient on therapy soon and follow him up in my clinic.

## 2023-08-13 NOTE — NURSING
Pt AAOX4. No distress noted. Bed in lowest position. Side rails up x2. Call bell and personal belongs within reach. Safety precautions maintained. Pt free of falls or injuries. Discussed pain management plan. No further issues or concerns at this time. Plan of care continues.

## 2023-08-13 NOTE — SUBJECTIVE & OBJECTIVE
Past Medical History:   Diagnosis Date    Anxiety     Asthma     Depression     Human immunodeficiency virus (HIV) disease        History reviewed. No pertinent surgical history.  Family History    None       Tobacco Use    Smoking status: Every Day     Current packs/day: 0.50     Types: Cigarettes    Smokeless tobacco: Never   Substance and Sexual Activity    Alcohol use: No    Drug use: Yes     Types: Marijuana    Sexual activity: Not on file       Review of patient's allergies indicates:  No Known Allergies    Medications:  Continuous Infusions:  Scheduled Meds:   doxycycline  100 mg Oral Q12H    piperacillin-tazobactam (Zosyn) IV (PEDS and ADULTS) (extended infusion is not appropriate)  4.5 g Intravenous Q8H    sodium chloride 0.9%  10 mL Intravenous Q8H    vancomycin (VANCOCIN) IV (PEDS and ADULTS)  15 mg/kg Intravenous Q12H     PRN Meds:acetaminophen, albuterol-ipratropium, aluminum-magnesium hydroxide-simethicone, HYDROcodone-acetaminophen, melatonin, naloxone, ondansetron, polyethylene glycol, prochlorperazine, simethicone, sodium chloride 0.9%, Pharmacy to dose Vancomycin consult **AND** vancomycin - pharmacy to dose    Objective:     Vital Signs (Most Recent):  Temp: 98.5 °F (36.9 °C) (08/12/23 2000)  Pulse: 90 (08/12/23 2000)  Resp: 18 (08/12/23 2000)  BP: 124/71 (08/12/23 2000)  SpO2: 99 % (08/12/23 2000) Vital Signs (24h Range):  Temp:  [98.2 °F (36.8 °C)-98.6 °F (37 °C)] 98.5 °F (36.9 °C)  Pulse:  [] 90  Resp:  [16-18] 18  SpO2:  [98 %-100 %] 99 %  BP: (124-143)/(69-84) 124/71     Weight: 66.7 kg (147 lb 0.8 oz)  Body mass index is 22.36 kg/m².     Physical Exam   Constitutional: He appears well-developed and well-nourished. No distress.   Neurological: He is alert and oriented to person, place, and time. He is not disoriented.   Psychiatric: He has a normal mood and affect.   Skin:                           Significant Labs: All pertinent labs within the past 24 hours have been  reviewed.    Significant Imaging: I have reviewed all pertinent imaging results/findings within the past 24 hours.

## 2023-08-13 NOTE — HPI
32yo M with PMH of HIV not on HAART (latest CD4 382 in May 2023; current CD4 pending), presents with L groin and axillary pain. Dermatology consulted for biopsy of R axilla.     Per patient and EMR, he first started to notice what he thought was an ingrown hair to his R axilla in 5/23, which he presented to the ED for at that time. Since then has developed multiple hard nodules to the area which occasionally drain blood and yellow pus. Also with similar nodules to the groin in the last month, and new scrotal pain for 3 days. In the last few months also endorses intermittent fevers, night sweats, and few HAs. On admission was found to have bilateral epididymitis, and was started on doxycycline. RPR also noted to be +.       Patient denies chills, vision changes, dizziness, fatigue, SOB, CP, diarrhea, blood in stool, or any exposure to animals or cat scratches. Also denies any personal or family history of HS. He reports being told he has HIV around 1/2023 but hasn't been able to get established to start any retrovirals.

## 2023-08-13 NOTE — ASSESSMENT & PLAN NOTE
- Interval history and physical exam findings as described above  - Suspect this has been developing longer than the 3 weeks he reported  - Infectious workup pending  - On vanc and zosyn  - S/p punch bx with derm on 8/11, path pending  - ID following

## 2023-08-14 PROBLEM — D69.6 THROMBOCYTOPENIA: Status: ACTIVE | Noted: 2023-08-14

## 2023-08-14 LAB
ALBUMIN SERPL BCP-MCNC: 2.8 G/DL (ref 3.5–5.2)
ALP SERPL-CCNC: 80 U/L (ref 55–135)
ALT SERPL W/O P-5'-P-CCNC: 23 U/L (ref 10–44)
ANION GAP SERPL CALC-SCNC: 9 MMOL/L (ref 8–16)
AST SERPL-CCNC: 37 U/L (ref 10–40)
BASOPHILS # BLD AUTO: 0.04 K/UL (ref 0–0.2)
BASOPHILS NFR BLD: 0.5 % (ref 0–1.9)
BILIRUB SERPL-MCNC: 0.6 MG/DL (ref 0.1–1)
BUN SERPL-MCNC: 12 MG/DL (ref 6–20)
CALCIUM SERPL-MCNC: 8.7 MG/DL (ref 8.7–10.5)
CD3+CD4+ CELLS # BLD: 485 CELLS/UL (ref 300–1400)
CD3+CD4+ CELLS # BLD: 523 CELLS/UL (ref 300–1400)
CD3+CD4+ CELLS NFR BLD: 24.2 % (ref 28–57)
CD3+CD4+ CELLS NFR BLD: 25 % (ref 28–57)
CHLORIDE SERPL-SCNC: 99 MMOL/L (ref 95–110)
CO2 SERPL-SCNC: 27 MMOL/L (ref 23–29)
CREAT SERPL-MCNC: 0.8 MG/DL (ref 0.5–1.4)
DIFFERENTIAL METHOD: ABNORMAL
EOSINOPHIL # BLD AUTO: 0.1 K/UL (ref 0–0.5)
EOSINOPHIL NFR BLD: 1.2 % (ref 0–8)
ERYTHROCYTE [DISTWIDTH] IN BLOOD BY AUTOMATED COUNT: 14.1 % (ref 11.5–14.5)
EST. GFR  (NO RACE VARIABLE): >60 ML/MIN/1.73 M^2
GLUCOSE SERPL-MCNC: 92 MG/DL (ref 70–110)
HCT VFR BLD AUTO: 29.4 % (ref 40–54)
HGB BLD-MCNC: 9.4 G/DL (ref 14–18)
HIV1 RNA # SERPL NAA+PROBE: ABNORMAL COPIES/ML
HIV1 RNA SERPL NAA+PROBE-LOG#: 4.67 LOG COPIES/ML
HIV1 RNA SERPL QL NAA+PROBE: DETECTED
IMM GRANULOCYTES # BLD AUTO: 0.04 K/UL (ref 0–0.04)
IMM GRANULOCYTES NFR BLD AUTO: 0.5 % (ref 0–0.5)
LYMPHOCYTES # BLD AUTO: 2.3 K/UL (ref 1–4.8)
LYMPHOCYTES NFR BLD: 27.6 % (ref 18–48)
MAGNESIUM SERPL-MCNC: 1.8 MG/DL (ref 1.6–2.6)
MCH RBC QN AUTO: 28.4 PG (ref 27–31)
MCHC RBC AUTO-ENTMCNC: 32 G/DL (ref 32–36)
MCV RBC AUTO: 89 FL (ref 82–98)
MONOCYTES # BLD AUTO: 0.6 K/UL (ref 0.3–1)
MONOCYTES NFR BLD: 6.7 % (ref 4–15)
NEUTROPHILS # BLD AUTO: 5.3 K/UL (ref 1.8–7.7)
NEUTROPHILS NFR BLD: 63.5 % (ref 38–73)
NRBC BLD-RTO: 0 /100 WBC
PHOSPHATE SERPL-MCNC: 3.8 MG/DL (ref 2.7–4.5)
PLATELET # BLD AUTO: 68 K/UL (ref 150–450)
PMV BLD AUTO: 12.1 FL (ref 9.2–12.9)
POTASSIUM SERPL-SCNC: 4.2 MMOL/L (ref 3.5–5.1)
PROT SERPL-MCNC: 6.4 G/DL (ref 6–8.4)
RBC # BLD AUTO: 3.31 M/UL (ref 4.6–6.2)
SODIUM SERPL-SCNC: 135 MMOL/L (ref 136–145)
T PALLIDUM AB SER QL IF: REACTIVE
WBC # BLD AUTO: 8.34 K/UL (ref 3.9–12.7)

## 2023-08-14 PROCEDURE — 99233 PR SUBSEQUENT HOSPITAL CARE,LEVL III: ICD-10-PCS | Mod: ,,, | Performed by: STUDENT IN AN ORGANIZED HEALTH CARE EDUCATION/TRAINING PROGRAM

## 2023-08-14 PROCEDURE — 63600175 PHARM REV CODE 636 W HCPCS: Performed by: STUDENT IN AN ORGANIZED HEALTH CARE EDUCATION/TRAINING PROGRAM

## 2023-08-14 PROCEDURE — 88342 IMHCHEM/IMCYTCHM 1ST ANTB: CPT | Mod: 26,,, | Performed by: PATHOLOGY

## 2023-08-14 PROCEDURE — A4216 STERILE WATER/SALINE, 10 ML: HCPCS | Performed by: STUDENT IN AN ORGANIZED HEALTH CARE EDUCATION/TRAINING PROGRAM

## 2023-08-14 PROCEDURE — 88342 IMHCHEM/IMCYTCHM 1ST ANTB: CPT | Mod: 59 | Performed by: PATHOLOGY

## 2023-08-14 PROCEDURE — 99233 PR SUBSEQUENT HOSPITAL CARE,LEVL III: ICD-10-PCS | Mod: ,,, | Performed by: INTERNAL MEDICINE

## 2023-08-14 PROCEDURE — 99233 SBSQ HOSP IP/OBS HIGH 50: CPT | Mod: ,,, | Performed by: STUDENT IN AN ORGANIZED HEALTH CARE EDUCATION/TRAINING PROGRAM

## 2023-08-14 PROCEDURE — 25000003 PHARM REV CODE 250

## 2023-08-14 PROCEDURE — 88305 TISSUE EXAM BY PATHOLOGIST: ICD-10-PCS | Mod: 26,,, | Performed by: PATHOLOGY

## 2023-08-14 PROCEDURE — 11000001 HC ACUTE MED/SURG PRIVATE ROOM

## 2023-08-14 PROCEDURE — 88312 SPECIAL STAINS GROUP 1: CPT | Performed by: PATHOLOGY

## 2023-08-14 PROCEDURE — 63600175 PHARM REV CODE 636 W HCPCS

## 2023-08-14 PROCEDURE — 88312 PR  SPECIAL STAINS,GROUP I: ICD-10-PCS | Mod: 26,,, | Performed by: PATHOLOGY

## 2023-08-14 PROCEDURE — 88305 TISSUE EXAM BY PATHOLOGIST: CPT | Mod: 26,,, | Performed by: PATHOLOGY

## 2023-08-14 PROCEDURE — 25000003 PHARM REV CODE 250: Performed by: INTERNAL MEDICINE

## 2023-08-14 PROCEDURE — 84100 ASSAY OF PHOSPHORUS: CPT | Performed by: STUDENT IN AN ORGANIZED HEALTH CARE EDUCATION/TRAINING PROGRAM

## 2023-08-14 PROCEDURE — 85025 COMPLETE CBC W/AUTO DIFF WBC: CPT | Performed by: STUDENT IN AN ORGANIZED HEALTH CARE EDUCATION/TRAINING PROGRAM

## 2023-08-14 PROCEDURE — 99233 SBSQ HOSP IP/OBS HIGH 50: CPT | Mod: ,,, | Performed by: INTERNAL MEDICINE

## 2023-08-14 PROCEDURE — 83735 ASSAY OF MAGNESIUM: CPT | Performed by: STUDENT IN AN ORGANIZED HEALTH CARE EDUCATION/TRAINING PROGRAM

## 2023-08-14 PROCEDURE — 88342 CHG IMMUNOCYTOCHEMISTRY: ICD-10-PCS | Mod: 26,,, | Performed by: PATHOLOGY

## 2023-08-14 PROCEDURE — 80053 COMPREHEN METABOLIC PANEL: CPT | Performed by: STUDENT IN AN ORGANIZED HEALTH CARE EDUCATION/TRAINING PROGRAM

## 2023-08-14 PROCEDURE — 87906 NFCT AGT GNTYP ALYS HIV1: CPT | Performed by: INTERNAL MEDICINE

## 2023-08-14 PROCEDURE — 25000003 PHARM REV CODE 250: Performed by: STUDENT IN AN ORGANIZED HEALTH CARE EDUCATION/TRAINING PROGRAM

## 2023-08-14 PROCEDURE — 87906 NFCT AGT GNTYP ALYS HIV1: CPT | Performed by: STUDENT IN AN ORGANIZED HEALTH CARE EDUCATION/TRAINING PROGRAM

## 2023-08-14 PROCEDURE — 87900 PHENOTYPE INFECT AGENT DRUG: CPT | Performed by: INTERNAL MEDICINE

## 2023-08-14 PROCEDURE — 88305 TISSUE EXAM BY PATHOLOGIST: CPT | Performed by: PATHOLOGY

## 2023-08-14 PROCEDURE — 27000207 HC ISOLATION

## 2023-08-14 PROCEDURE — 88312 SPECIAL STAINS GROUP 1: CPT | Mod: 26,,, | Performed by: PATHOLOGY

## 2023-08-14 PROCEDURE — 99900035 HC TECH TIME PER 15 MIN (STAT)

## 2023-08-14 RX ORDER — LIDOCAINE HYDROCHLORIDE AND EPINEPHRINE 10; 10 MG/ML; UG/ML
1 INJECTION, SOLUTION INFILTRATION; PERINEURAL ONCE
Status: COMPLETED | OUTPATIENT
Start: 2023-08-14 | End: 2023-08-14

## 2023-08-14 RX ADMIN — OXYCODONE AND ACETAMINOPHEN 1 TABLET: 10; 325 TABLET ORAL at 03:08

## 2023-08-14 RX ADMIN — PROCHLORPERAZINE EDISYLATE 5 MG: 5 INJECTION INTRAMUSCULAR; INTRAVENOUS at 06:08

## 2023-08-14 RX ADMIN — PROCHLORPERAZINE EDISYLATE 5 MG: 5 INJECTION INTRAMUSCULAR; INTRAVENOUS at 11:08

## 2023-08-14 RX ADMIN — Medication 10 ML: at 06:08

## 2023-08-14 RX ADMIN — LIDOCAINE HYDROCHLORIDE 1 ML: 10 INJECTION, SOLUTION INFILTRATION; PERINEURAL at 08:08

## 2023-08-14 RX ADMIN — PIPERACILLIN SODIUM AND TAZOBACTAM SODIUM 4.5 G: 4; .5 INJECTION, POWDER, FOR SOLUTION INTRAVENOUS at 01:08

## 2023-08-14 RX ADMIN — OXYCODONE AND ACETAMINOPHEN 1 TABLET: 10; 325 TABLET ORAL at 01:08

## 2023-08-14 RX ADMIN — VANCOMYCIN HYDROCHLORIDE 1000 MG: 1 INJECTION, POWDER, LYOPHILIZED, FOR SOLUTION INTRAVENOUS at 04:08

## 2023-08-14 RX ADMIN — PIPERACILLIN SODIUM AND TAZOBACTAM SODIUM 4.5 G: 4; .5 INJECTION, POWDER, FOR SOLUTION INTRAVENOUS at 09:08

## 2023-08-14 RX ADMIN — Medication 10 ML: at 02:08

## 2023-08-14 RX ADMIN — OXYCODONE AND ACETAMINOPHEN 1 TABLET: 10; 325 TABLET ORAL at 05:08

## 2023-08-14 RX ADMIN — VANCOMYCIN HYDROCHLORIDE 1000 MG: 1 INJECTION, POWDER, LYOPHILIZED, FOR SOLUTION INTRAVENOUS at 05:08

## 2023-08-14 RX ADMIN — DOXYCYCLINE HYCLATE 100 MG: 100 TABLET, COATED ORAL at 09:08

## 2023-08-14 RX ADMIN — OXYCODONE AND ACETAMINOPHEN 1 TABLET: 10; 325 TABLET ORAL at 09:08

## 2023-08-14 RX ADMIN — OXYCODONE AND ACETAMINOPHEN 1 TABLET: 10; 325 TABLET ORAL at 10:08

## 2023-08-14 RX ADMIN — PIPERACILLIN SODIUM AND TAZOBACTAM SODIUM 4.5 G: 4; .5 INJECTION, POWDER, FOR SOLUTION INTRAVENOUS at 06:08

## 2023-08-14 RX ADMIN — Medication 10 ML: at 10:08

## 2023-08-14 RX ADMIN — DOXYCYCLINE HYCLATE 100 MG: 100 TABLET, COATED ORAL at 10:08

## 2023-08-14 NOTE — SUBJECTIVE & OBJECTIVE
"Medications:  Continuous Infusions:  Scheduled Meds:   doxycycline  100 mg Oral Q12H    LIDOcaine-EPINEPHrine 1%-1:100,000  1 mL Intradermal Once    piperacillin-tazobactam (Zosyn) IV (PEDS and ADULTS) (extended infusion is not appropriate)  4.5 g Intravenous Q8H    sodium chloride 0.9%  10 mL Intravenous Q8H    vancomycin (VANCOCIN) IV (PEDS and ADULTS)  15 mg/kg Intravenous Q12H     PRN Meds:acetaminophen, albuterol-ipratropium, aluminum-magnesium hydroxide-simethicone, melatonin, naloxone, ondansetron, oxyCODONE-acetaminophen, polyethylene glycol, prochlorperazine, simethicone, sodium chloride 0.9%, Pharmacy to dose Vancomycin consult **AND** vancomycin - pharmacy to dose     No current facility-administered medications on file prior to encounter.     No current outpatient medications on file prior to encounter.       Review of patient's allergies indicates:  No Known Allergies    Past Medical History:   Diagnosis Date    Anxiety     Asthma     Depression     Human immunodeficiency virus (HIV) disease      History reviewed. No pertinent surgical history.  Family History    None       Tobacco Use    Smoking status: Every Day     Current packs/day: 0.50     Types: Cigarettes    Smokeless tobacco: Never   Substance and Sexual Activity    Alcohol use: No    Drug use: Yes     Types: Marijuana    Sexual activity: Not on file     Review of Systems   Constitutional:  Negative for chills, fever and unexpected weight change.   HENT:  Negative for congestion, drooling, facial swelling, sore throat, trouble swallowing and voice change.    Eyes:  Negative for visual disturbance.   Respiratory:  Negative for choking, shortness of breath and stridor.    Gastrointestinal:  Negative for nausea and vomiting.   Skin:         Right axillary lesion "ingrown"     Objective:     Vital Signs (Most Recent):  Temp: 98 °F (36.7 °C) (08/14/23 0535)  Pulse: 92 (08/14/23 0535)  Resp: 18 (08/14/23 0549)  BP: 123/75 (08/14/23 0535)  SpO2: 99 % " (08/14/23 0535) Vital Signs (24h Range):  Temp:  [98 °F (36.7 °C)-99.1 °F (37.3 °C)] 98 °F (36.7 °C)  Pulse:  [88-96] 92  Resp:  [16-18] 18  SpO2:  [97 %-100 %] 99 %  BP: (123-142)/(70-85) 123/75     Weight: 66.7 kg (147 lb 0.8 oz)  Body mass index is 22.36 kg/m².         Physical Exam  NAD  Awake and alert  Head atraumatic   Hypertrophic mucosa with underlying hyperpigmentation in R buccal space/retromolar trigone extending to the parotid duct papilla. No drainage. Sent for bx.  White exophytic papules diffusely: inner vermilion upper lip, right lateral tongue, volar tongue. Sent for bx.  Raised flat erythematous plaques: inner vermilion upper lip, L buccal mucosa. Sent for bx.  Neck soft, not TTP, normal ROM, no LAD  Normal WOB, no stridor or stertor       Significant Labs:  CBC:   Recent Labs   Lab 08/13/23  0957   WBC 7.94   RBC 3.40*   HGB 9.3*   HCT 30.1*   PLT 66*   MCV 89   MCH 27.4   MCHC 30.9*     CMP:   Recent Labs   Lab 08/13/23  0957   *   CALCIUM 8.9   ALBUMIN 2.9*   PROT 6.4   *   K 4.3   CO2 27   CL 98   BUN 12   CREATININE 0.8   ALKPHOS 76   ALT 23   AST 37   BILITOT 0.5       Significant Diagnostics:  None

## 2023-08-14 NOTE — PLAN OF CARE
Problem: Adult Inpatient Plan of Care  Goal: Plan of Care Review  Outcome: Ongoing, Progressing     Problem: Adult Inpatient Plan of Care  Goal: Optimal Comfort and Wellbeing  Outcome: Ongoing, Progressing     Problem: Infection  Goal: Absence of Infection Signs and Symptoms  Outcome: Ongoing, Not Progressing     Problem: Pain Acute  Goal: Acceptable Pain Control and Functional Ability  Outcome: Ongoing, Not Progressing

## 2023-08-14 NOTE — SUBJECTIVE & OBJECTIVE
Interval History: Pt seen and examined this morning on aiden DANIELS. Pain improved today, underwent oral bx with ENT without issue. Discussed continuing abx and awaiting pathology results. Care plan reviewed. Otherwise, doing well and with no further complaints at this time.      Objective:     Vital Signs (Most Recent):  Temp: 98.1 °F (36.7 °C) (08/14/23 0813)  Pulse: 89 (08/14/23 0813)  Resp: 18 (08/14/23 0948)  BP: 115/74 (08/14/23 0813)  SpO2: 99 % (08/14/23 0813) Vital Signs (24h Range):  Temp:  [98 °F (36.7 °C)-98.4 °F (36.9 °C)] 98.1 °F (36.7 °C)  Pulse:  [88-92] 89  Resp:  [16-18] 18  SpO2:  [99 %-100 %] 99 %  BP: (115-142)/(70-85) 115/74     Weight: 66.7 kg (147 lb 0.8 oz)  Body mass index is 22.36 kg/m².    Intake/Output Summary (Last 24 hours) at 8/14/2023 1117  Last data filed at 8/14/2023 0554  Gross per 24 hour   Intake --   Output 1200 ml   Net -1200 ml         Physical Exam  Gen: in NAD, appears stated age; generalized LAD  Neuro: AAOx4, CN2-12 grossly intact BL; motor, sensory, and strength grossly intact BL  HEENT: NTNC, EOMI, PERRLA, MMM; no thyromegaly or lymphadenopathy; no JVD appreciated  CVS: RRR, no m/r/g; S1/S2 auscultated with no S3 or S4; capillary refill < 2 sec  Resp: lungs CTAB, no w/r/r; no belabored breathing or accessory muscle use appreciated   Abd: BS+ in all 4 quadrants; NTND, soft to palpation; no organomegaly appreciated  : testes and penile shaft edematous and tender to palpation, raise rash noted above the pubis (see below)  Extrem: pulses full, equal, and regular over all 4 extremities; no UE or LE edema BL; see images below of right axillae               Significant Labs: All pertinent labs within the past 24 hours have been reviewed.    Significant Imaging: I have reviewed all pertinent imaging results/findings within the past 24 hours.

## 2023-08-14 NOTE — PLAN OF CARE
[facility]  Initial Discharge Assessment       Primary Care Provider: No, Primary Doctor    Admission Diagnosis: Epididymitis [N45.1]  Tobacco abuse [Z72.0]  Inguinal lymphadenopathy [R59.0]  Tobacco abuse counseling [Z71.6]  Chest pain [R07.9]  Scrotum pain [N50.82]    Admission Date: 8/11/2023  Expected Discharge Date: 8/16/2023         Payor: MEDICAID / [facility] / Product Type: Managed Medicaid /     Extended Emergency Contact Information  Primary Emergency Contact: Kirsten Fisher   [facility]  Home Phone: [phone]  Relation: Sister    Discharge Plan A: (P) Home  Discharge Plan B: (P) Home      [id number] - [place], [place] - [address] AT [facility]  [address] [place] [address]  Phone: [phone] Fax: [phone]      Initial Assessment (most recent)       Adult Discharge Assessment - 08/14/23 1358          Discharge Assessment    Assessment Type Discharge Planning Assessment (P)      Confirmed/corrected address, phone number and insurance Yes (P)      Confirmed Demographics -- (P)    [address] [place] [address]    Source of Information patient (P)      People in Home alone (P)      Do you expect to return to your current living situation? Yes (P)      Prior to hospitilization cognitive status: Alert/Oriented (P)      Current cognitive status: Alert/Oriented (P)      Equipment Currently Used at Home none (P)      Readmission within 30 days? No (P)      Patient currently being followed by outpatient case management? No (P)      Do you have prescription coverage? Yes (P)      Do you have any problems affording any of your prescribed medications? TBD (P)      Are you on dialysis? No (P)      Do you take coumadin? No (P)      Discharge Plan A Home (P)      Discharge Plan B Home (P)                             SW completed Discharge Planning Assessment with  patient via telephone. Discharge planning booklet given to patient/family and whiteboard updated with MAX and phone #. All questions answered.    Patient reported that he will have transportation upon discharge.     Patient reported that he lives alone, and prior to hospitalization he was independent with his ADL's. Patient reported that he is not on dialysis and does not go to a Coumadin clinic.      Patient lives in a [facility] with four steps to enter.       Yamila Cortes LMSW  [facility]  Ext. [phone]

## 2023-08-14 NOTE — ASSESSMENT & PLAN NOTE
- Interval history and physical exam findings as described above  - Scrotal US findings reviewed  - S/p IM rocephin in the ED for gonorrhea  - Infectious workup pending  - On vanc, zosyn, and doxy per ID  - Afebrile, no leukocytosis  - ID following  - PRN analgesics provided  - Continuing to monitor

## 2023-08-14 NOTE — PLAN OF CARE
Problem: Adult Inpatient Plan of Care  Goal: Patient-Specific Goal (Individualized)  Outcome: Ongoing, Progressing  Goal: Absence of Hospital-Acquired Illness or Injury  Outcome: Ongoing, Progressing  Goal: Optimal Comfort and Wellbeing  Outcome: Ongoing, Progressing     Problem: Infection  Goal: Absence of Infection Signs and Symptoms  Outcome: Ongoing, Progressing     Problem: Pain Acute  Goal: Acceptable Pain Control and Functional Ability  Outcome: Ongoing, Progressing     Problem: Skin or Soft Tissue Infection  Goal: Absence of Infection Signs and Symptoms  Outcome: Ongoing, Progressing     Pt progressing toward goals. No distress noted. No falls or injuries during shift. Pt bed in lowest position. Side rails x2. Call bell and personal belongs within reach. Safety precautions maintained.

## 2023-08-14 NOTE — ASSESSMENT & PLAN NOTE
Victorino Fisher is a 33 y.o. male admitted for Epididymitis; Tobacco abuse; Inguinal lymphadenopathy; Tobacco abuse couns* on 8/11/2023.   ENT consulted for mucosal lesions. Now s/p bedside biopsy of three distinct lesions: (1)erythematous plaque inside upper lip, (2) white exophytic papule inside upper lip, (3)hyperpigmentation at Right RMT. Suspect Right buccal lesion is reactive to adjacent sharp teeth. Ddx for lip lesions includes condyloma nallely, condyloma accuminata, multifocal epithelial hyperplasia.     -- F/u path results   -- Remainder of care per primary team  -- Please call ENT on call with any additional questions/concerns.    Procedure note: explained r/b/a of oral mucosa biopsy, patient gave verbal consent. Lesions above injected with 1% lido with epi. Biopsy taken with cupped forceps and scalpel. Hemostasis achieved by holding pressure with phenylephrine soaked gauze. Tolerated well.

## 2023-08-14 NOTE — CONSULTS
[facility]  Otorhinolaryngology-Head & Neck Surgery  Consult Note    Patient Name: Victorino Fisher  MRN: [id number]  Code Status: Full Code  Admission Date: 8/11/2023  Hospital Length of Stay: 0 days  Attending Physician: Gwyn Mora MD  Primary Care Provider: [facility] Primary Doctor    Patient information was obtained from patient and past medical records.     Inpatient consult to ENT  Consult performed by: Ari Gould MD  Consult ordered by: Gwyn Mora MD        Subjective:     Chief Complaint/Reason for Admission: multiple cutaneous lesions    History of Present Illness: Victorino Fisher is a 33 y.o. male with history of HIV not on HAART below that presents to the hospital for bilateral epididymitis with multiple cutaneous lesions. ENT consulted for possible biopsy of oral mucosal lesions. Patient being followed by ID and dermatology concern for various lesions including inguinal LAD, axillary lesions s/p biopsy. Workup ongoing with differential including Kaposi sarcoma vs Bacillary angiomatosis, and other possibilities may include atypical condyloma secondary to HPV or Syphilis per ID/HM documentation.      Denies recent voice changes, difficulty breathing/SOB, dysphagia, odynophagia, new lumps/bumps of head/neck, unintentional wt loss.  Denies recent nasal obstruction, rhinorrhea, PND, facial pain/pressure, anosmia.   Denies recent hearing loss, tinnitus, otorrhea, otalgia, dizziness, balance difficulties.               Objective:     Vital Signs (Most Recent):  Temp: 98 °F (36.7 °C) (08/14/23 0535)  Pulse: 92 (08/14/23 0535)  Resp: 18 (08/14/23 0549)  BP: 123/75 (08/14/23 0535)  SpO2: 99 % (08/14/23 0535) Vital Signs (24h Range):  Temp:  [98 °F (36.7 °C)-99.1 °F (37.3 °C)] 98 °F (36.7 °C)  Pulse:  [88-96] 92  Resp:  [16-18] 18  SpO2:  [97 %-100 %] 99 %  BP: (123-142)/(70-85) 123/75     Weight: 66.7 kg (147 lb 0.8 oz)  Body mass index is 22.36 kg/m².         Physical  Exam  NAD  Awake and alert  Head atraumatic   Hypertrophic mucosa with underlying hyperpigmentation in R buccal space/retromolar trigone extending to the parotid duct papilla. No drainage. Sent for bx.  White exophytic papules diffusely: inner vermilion upper lip, right lateral tongue, volar tongue. Sent for bx.  Raised flat erythematous plaques: inner vermilion upper lip, L buccal mucosa. Sent for bx.  Neck soft, normal ROM. Diffuse tender mobile cervical and submandibular LAD.   Normal WOB, no stridor or stertor       Significant Labs:  CBC:   Recent Labs   Lab 08/13/23  0957   WBC 7.94   RBC 3.40*   HGB 9.3*   HCT 30.1*   PLT 66*   MCV 89   MCH 27.4   MCHC 30.9*     CMP:   Recent Labs   Lab 08/13/23  0957   *   CALCIUM 8.9   ALBUMIN 2.9*   PROT 6.4   *   K 4.3   CO2 27   CL 98   BUN 12   CREATININE 0.8   ALKPHOS 76   ALT 23   AST 37   BILITOT 0.5       Significant Diagnostics:  None  Assessment/Plan:     Mouth lesion  Victorino Fisher is a 33 y.o. male admitted for Epididymitis; Tobacco abuse; Inguinal lymphadenopathy; Tobacco abuse couns* on 8/11/2023.   ENT consulted for mucosal lesions. Now s/p bedside biopsy of three distinct lesions: (1)erythematous plaque inside upper lip, (2) white exophytic papule inside upper lip, (3)hyperpigmentation at Right RMT. Suspect Right buccal lesion is reactive to adjacent sharp teeth. Ddx for lip lesions includes condyloma nallely, condyloma accuminata, multifocal epithelial hyperplasia.     -- F/u path results   -- Remainder of care per primary team  -- Please call ENT on call with any additional questions/concerns.    Procedure note: explained r/b/a of oral mucosa biopsy, patient gave verbal consent. Lesions above injected with 1% lido with epi. Biopsy taken with cupped forceps and scalpel. Hemostasis achieved by holding pressure with phenylephrine soaked gauze. Tolerated well.         VTE Risk Mitigation (From admission, onward)           Ordered     Place  sequential compression device  Until discontinued         08/11/23 1555     IP VTE LOW RISK PATIENT  Once         08/11/23 1555     Place sequential compression device  Until discontinued         08/11/23 1555                    Thank you for your consult. I will sign off. Please contact us if you have any additional questions.    Ari Gould MD  Otorhinolaryngology-Head & Neck Surgery  [facility]

## 2023-08-14 NOTE — HPI
Victorino Fisher is a 33 y.o. male with history of HIV not on HAART below that presents to the hospital for bilateral epididymitis with multiple cutaneous lesions. ENT consulted for possible biopsy of oral mucosal lesions. Patient being followed by ID and dermatology concern for various lesions including inguinal LAD, axillary lesions s/p biopsy. Workup ongoing with differential including Kaposi sarcoma vs Bacillary angiomatosis, and other possibilities may include atypical condyloma secondary to HPV or Syphilis per ID/HM documentation.      Denies recent voice changes, difficulty breathing/SOB, dysphagia, odynophagia, new lumps/bumps of head/neck, unintentional wt loss.  Denies recent nasal obstruction, rhinorrhea, PND, facial pain/pressure, anosmia.   Denies recent hearing loss, tinnitus, otorrhea, otalgia, dizziness, balance difficulties.

## 2023-08-14 NOTE — PLAN OF CARE
Problem: Adult Inpatient Plan of Care  Goal: Plan of Care Review  Outcome: Ongoing, Progressing  Goal: Patient-Specific Goal (Individualized)  Outcome: Ongoing, Progressing  Goal: Absence of Hospital-Acquired Illness or Injury  Outcome: Ongoing, Progressing  Goal: Optimal Comfort and Wellbeing  Outcome: Ongoing, Progressing  Goal: Readiness for Transition of Care  Outcome: Ongoing, Progressing     Problem: Infection  Goal: Absence of Infection Signs and Symptoms  Outcome: Ongoing, Progressing     Problem: Pain Acute  Goal: Acceptable Pain Control and Functional Ability  Outcome: Ongoing, Progressing     Problem: Skin or Soft Tissue Infection  Goal: Absence of Infection Signs and Symptoms  Outcome: Ongoing, Progressing

## 2023-08-14 NOTE — PROGRESS NOTES
"[facility] - [facility] [address] [place] [address] Medicine  Progress Note    Patient Name: Victorino Fisher  MRN: [id number]  Patient Class: IP- Inpatient   Admission Date: 8/11/2023  Length of Stay: 0 days  Attending Physician: Gwyn Mora MD  Primary Care Provider: Mahogany, Primary Doctor        Subjective:     Principal Problem:Bilateral epididymitis        HPI:  Victorino Fisher is a 32yo M with a PMH of HIV (not on HAART) and tobacco abuse who [place] [facility] [place] with complaints of scrotal pain and pain in his right axilla. Pt reports an "ingrown hair" in his right armpit 3 weeks prior, which has allegedly enlarged into a mass over this short time pain; associated with occasional purulent discharge and pain with ROM. States he has not thought much of it. Also with 2-3 day history of BL testicular pain and scrotal swelling. Denies F/C/N/V/D/C, HA, SOB, CP, palpitations, abdominal pain, dysuria, extremity swelling, or symptoms otherwise.    In the ED, VSS. Labs remarkable for Hb 10.8, though were otherwise grossly asymptomatic. Scrotal US showed sonographic findings suggestive of bilateral epididymitis with scrotal wall thickening, bilateral groin masses measuring up to 3.4 cm which likely represent pathologic lymphadenopathy, bilateral epididymal cysts, punctate left testicular cystic focus too small to characterize, and bilateral varicoceles. ED gave doxy, rocephin, and vanc and consulted ID and derm. Hospital medicine was consulted for admission and further management.      Overview/Hospital Course:  [place] [facility] [place] empiric vanc and zosyn; doxy added per ID recs. Received IM rocephin in the ED for gonorrhea. Dermatology consulted for biopsy of right axillary lesion; path pending. RPR reactive, consistent with active syphilis, on doxy per ID. ENT consulted for oral lesion, biopsied on 8/14.      Interval History: Pt seen and examined this morning on aiden DANIELS. Pain improved today, underwent " oral bx with ENT without issue. Discussed continuing abx and awaiting pathology results. Care plan reviewed. Otherwise, doing well and with no further complaints at this time.      Objective:     Vital Signs (Most Recent):  Temp: 98.1 °F (36.7 °C) (08/14/23 0813)  Pulse: 89 (08/14/23 0813)  Resp: 18 (08/14/23 0948)  BP: 115/74 (08/14/23 0813)  SpO2: 99 % (08/14/23 0813) Vital Signs (24h Range):  Temp:  [98 °F (36.7 °C)-98.4 °F (36.9 °C)] 98.1 °F (36.7 °C)  Pulse:  [88-92] 89  Resp:  [16-18] 18  SpO2:  [99 %-100 %] 99 %  BP: (115-142)/(70-85) 115/74     Weight: 66.7 kg (147 lb 0.8 oz)  Body mass index is 22.36 kg/m².    Intake/Output Summary (Last 24 hours) at 8/14/2023 1117  Last data filed at 8/14/2023 0554  Gross per 24 hour   Intake --   Output 1200 ml   Net -1200 ml         Physical Exam  Gen: in NAD, appears stated age; generalized LAD  Neuro: AAOx4, CN2-12 grossly intact BL; motor, sensory, and strength grossly intact BL  HEENT: NTNC, EOMI, PERRLA, MMM; no thyromegaly or lymphadenopathy; no JVD appreciated  CVS: RRR, no m/r/g; S1/S2 auscultated with no S3 or S4; capillary refill < 2 sec  Resp: lungs CTAB, no w/r/r; no belabored breathing or accessory muscle use appreciated   Abd: BS+ in all 4 quadrants; NTND, soft to palpation; no organomegaly appreciated  : testes and penile shaft edematous and tender to palpation, raise rash noted above the pubis (see below)  Extrem: pulses full, equal, and regular over all 4 extremities; no UE or LE edema BL; see images below of right axillae               Significant Labs: All pertinent labs within the past 24 hours have been reviewed.    Significant Imaging: I have reviewed all pertinent imaging results/findings within the past 24 hours.      Assessment/Plan:      * Bilateral epididymitis  - Interval history and physical exam findings as described above  - Scrotal US findings reviewed  - S/p IM rocephin in the ED for gonorrhea  - Infectious workup pending  - On vanc,  zosyn, and doxy per ID  - Afebrile, no leukocytosis  - ID following  - PRN analgesics provided  - Continuing to monitor    Mass of right axilla  - Interval history and physical exam findings as described above  - Suspect this has been developing longer than the 3 weeks he reported  - Infectious workup pending  - On vanc and zosyn  - S/p punch bx with derm on 8/11, path pending  - ID following    Rash of groin  - Interval history and physical exam findings as described above  - Infectious workup pending  - On vanc and zosyn  - Derm and ID following    Diffuse lymphadenopathy  - Likely related to infectious source in the setting of untreated HIV    HIV (human immunodeficiency virus infection)  - Noncompliant with HAART  - CD4 count and HIV viral load pending  - ID following    Syphilis  - RPR 1:4  - Continue doxy per ID    Mouth lesion  - Noted on derm eval  - Biopsied by ENT on 8/14, path results pending    Thrombocytopenia  - Noted since admission  - Monitoring on daily labs    Normocytic anemia  - H/H lower than previous on admission  - Will continue to monitor on daily labs    Tobacco abuse  - Pt with 0.5 ppd smoking history for most of adult life  - Currently without plans to quit  - Counseled on the importance of smoking cessation in relation to health     Tobacco abuse counseling  - As above      VTE Risk Mitigation (From admission, onward)         Ordered     Place sequential compression device  Until discontinued         08/11/23 1555     IP VTE LOW RISK PATIENT  Once         08/11/23 1555     Place sequential compression device  Until discontinued         08/11/23 1555                Discharge Planning   MAX: 8/16/2023     Code Status: Full Code   Is the patient medically ready for discharge?: No    Reason for patient still in hospital (select all that apply): Patient trending condition               Gwyn Mora MD  Attending Physician  Medical Director - [facility]  Department of [facility]  Medicine  8/14/2023

## 2023-08-15 LAB
ALBUMIN SERPL BCP-MCNC: 2.8 G/DL (ref 3.5–5.2)
ALP SERPL-CCNC: 86 U/L (ref 55–135)
ALT SERPL W/O P-5'-P-CCNC: 29 U/L (ref 10–44)
ANION GAP SERPL CALC-SCNC: 7 MMOL/L (ref 8–16)
AST SERPL-CCNC: 45 U/L (ref 10–40)
BACTERIA SPEC AEROBE CULT: ABNORMAL
BASOPHILS # BLD AUTO: 0.06 K/UL (ref 0–0.2)
BASOPHILS NFR BLD: 0.6 % (ref 0–1.9)
BILIRUB SERPL-MCNC: 0.5 MG/DL (ref 0.1–1)
BUN SERPL-MCNC: 14 MG/DL (ref 6–20)
CALCIUM SERPL-MCNC: 8.9 MG/DL (ref 8.7–10.5)
CHLORIDE SERPL-SCNC: 100 MMOL/L (ref 95–110)
CO2 SERPL-SCNC: 28 MMOL/L (ref 23–29)
CREAT SERPL-MCNC: 0.9 MG/DL (ref 0.5–1.4)
DIFFERENTIAL METHOD: ABNORMAL
EOSINOPHIL # BLD AUTO: 0.1 K/UL (ref 0–0.5)
EOSINOPHIL NFR BLD: 0.9 % (ref 0–8)
ERYTHROCYTE [DISTWIDTH] IN BLOOD BY AUTOMATED COUNT: 14 % (ref 11.5–14.5)
EST. GFR  (NO RACE VARIABLE): >60 ML/MIN/1.73 M^2
GLUCOSE SERPL-MCNC: 93 MG/DL (ref 70–110)
HCT VFR BLD AUTO: 29.7 % (ref 40–54)
HGB BLD-MCNC: 9.5 G/DL (ref 14–18)
IMM GRANULOCYTES # BLD AUTO: 0.04 K/UL (ref 0–0.04)
IMM GRANULOCYTES NFR BLD AUTO: 0.4 % (ref 0–0.5)
LYMPHOCYTES # BLD AUTO: 3.6 K/UL (ref 1–4.8)
LYMPHOCYTES NFR BLD: 35.5 % (ref 18–48)
MAGNESIUM SERPL-MCNC: 1.9 MG/DL (ref 1.6–2.6)
MCH RBC QN AUTO: 28.2 PG (ref 27–31)
MCHC RBC AUTO-ENTMCNC: 32 G/DL (ref 32–36)
MCV RBC AUTO: 88 FL (ref 82–98)
MONOCYTES # BLD AUTO: 0.7 K/UL (ref 0.3–1)
MONOCYTES NFR BLD: 6.5 % (ref 4–15)
NEUTROPHILS # BLD AUTO: 5.7 K/UL (ref 1.8–7.7)
NEUTROPHILS NFR BLD: 56.1 % (ref 38–73)
NRBC BLD-RTO: 0 /100 WBC
PHOSPHATE SERPL-MCNC: 3.6 MG/DL (ref 2.7–4.5)
PLATELET # BLD AUTO: 73 K/UL (ref 150–450)
PMV BLD AUTO: 11.9 FL (ref 9.2–12.9)
POTASSIUM SERPL-SCNC: 5.2 MMOL/L (ref 3.5–5.1)
POTASSIUM SERPL-SCNC: 5.3 MMOL/L (ref 3.5–5.1)
PROT SERPL-MCNC: 6.5 G/DL (ref 6–8.4)
RBC # BLD AUTO: 3.37 M/UL (ref 4.6–6.2)
SODIUM SERPL-SCNC: 135 MMOL/L (ref 136–145)
VANCOMYCIN TROUGH SERPL-MCNC: 11 UG/ML (ref 10–22)
WBC # BLD AUTO: 10.16 K/UL (ref 3.9–12.7)

## 2023-08-15 PROCEDURE — 83735 ASSAY OF MAGNESIUM: CPT | Performed by: STUDENT IN AN ORGANIZED HEALTH CARE EDUCATION/TRAINING PROGRAM

## 2023-08-15 PROCEDURE — 84100 ASSAY OF PHOSPHORUS: CPT | Performed by: STUDENT IN AN ORGANIZED HEALTH CARE EDUCATION/TRAINING PROGRAM

## 2023-08-15 PROCEDURE — A4216 STERILE WATER/SALINE, 10 ML: HCPCS | Performed by: STUDENT IN AN ORGANIZED HEALTH CARE EDUCATION/TRAINING PROGRAM

## 2023-08-15 PROCEDURE — 84132 ASSAY OF SERUM POTASSIUM: CPT | Performed by: STUDENT IN AN ORGANIZED HEALTH CARE EDUCATION/TRAINING PROGRAM

## 2023-08-15 PROCEDURE — 25000003 PHARM REV CODE 250: Performed by: EMERGENCY MEDICINE

## 2023-08-15 PROCEDURE — 11000001 HC ACUTE MED/SURG PRIVATE ROOM

## 2023-08-15 PROCEDURE — 80202 ASSAY OF VANCOMYCIN: CPT | Performed by: STUDENT IN AN ORGANIZED HEALTH CARE EDUCATION/TRAINING PROGRAM

## 2023-08-15 PROCEDURE — 25000003 PHARM REV CODE 250: Performed by: INTERNAL MEDICINE

## 2023-08-15 PROCEDURE — 99233 SBSQ HOSP IP/OBS HIGH 50: CPT | Mod: ,,, | Performed by: STUDENT IN AN ORGANIZED HEALTH CARE EDUCATION/TRAINING PROGRAM

## 2023-08-15 PROCEDURE — 85025 COMPLETE CBC W/AUTO DIFF WBC: CPT | Performed by: STUDENT IN AN ORGANIZED HEALTH CARE EDUCATION/TRAINING PROGRAM

## 2023-08-15 PROCEDURE — 80053 COMPREHEN METABOLIC PANEL: CPT | Performed by: STUDENT IN AN ORGANIZED HEALTH CARE EDUCATION/TRAINING PROGRAM

## 2023-08-15 PROCEDURE — 99233 PR SUBSEQUENT HOSPITAL CARE,LEVL III: ICD-10-PCS | Mod: ,,, | Performed by: INTERNAL MEDICINE

## 2023-08-15 PROCEDURE — 25000003 PHARM REV CODE 250: Performed by: STUDENT IN AN ORGANIZED HEALTH CARE EDUCATION/TRAINING PROGRAM

## 2023-08-15 PROCEDURE — 99233 PR SUBSEQUENT HOSPITAL CARE,LEVL III: ICD-10-PCS | Mod: ,,, | Performed by: STUDENT IN AN ORGANIZED HEALTH CARE EDUCATION/TRAINING PROGRAM

## 2023-08-15 PROCEDURE — 27000207 HC ISOLATION

## 2023-08-15 PROCEDURE — 99233 SBSQ HOSP IP/OBS HIGH 50: CPT | Mod: ,,, | Performed by: INTERNAL MEDICINE

## 2023-08-15 PROCEDURE — 94761 N-INVAS EAR/PLS OXIMETRY MLT: CPT

## 2023-08-15 PROCEDURE — 36415 COLL VENOUS BLD VENIPUNCTURE: CPT | Performed by: STUDENT IN AN ORGANIZED HEALTH CARE EDUCATION/TRAINING PROGRAM

## 2023-08-15 PROCEDURE — 63600175 PHARM REV CODE 636 W HCPCS: Performed by: STUDENT IN AN ORGANIZED HEALTH CARE EDUCATION/TRAINING PROGRAM

## 2023-08-15 RX ADMIN — OXYCODONE AND ACETAMINOPHEN 1 TABLET: 10; 325 TABLET ORAL at 05:08

## 2023-08-15 RX ADMIN — SODIUM ZIRCONIUM CYCLOSILICATE 10 G: 10 POWDER, FOR SUSPENSION ORAL at 09:08

## 2023-08-15 RX ADMIN — DOXYCYCLINE HYCLATE 100 MG: 100 TABLET, COATED ORAL at 09:08

## 2023-08-15 RX ADMIN — Medication 10 ML: at 10:08

## 2023-08-15 RX ADMIN — Medication 6 MG: at 10:08

## 2023-08-15 RX ADMIN — Medication 10 ML: at 05:08

## 2023-08-15 RX ADMIN — DOXYCYCLINE HYCLATE 100 MG: 100 TABLET, COATED ORAL at 10:08

## 2023-08-15 RX ADMIN — BICTEGRAVIR SODIUM, EMTRICITABINE, AND TENOFOVIR ALAFENAMIDE FUMARATE 1 TABLET: 50; 200; 25 TABLET ORAL at 11:08

## 2023-08-15 RX ADMIN — VANCOMYCIN HYDROCHLORIDE 1250 MG: 1.25 INJECTION, POWDER, LYOPHILIZED, FOR SOLUTION INTRAVENOUS at 05:08

## 2023-08-15 RX ADMIN — OXYCODONE AND ACETAMINOPHEN 1 TABLET: 10; 325 TABLET ORAL at 09:08

## 2023-08-15 RX ADMIN — OXYCODONE AND ACETAMINOPHEN 1 TABLET: 10; 325 TABLET ORAL at 06:08

## 2023-08-15 RX ADMIN — OXYCODONE AND ACETAMINOPHEN 1 TABLET: 10; 325 TABLET ORAL at 02:08

## 2023-08-15 RX ADMIN — OXYCODONE AND ACETAMINOPHEN 1 TABLET: 10; 325 TABLET ORAL at 10:08

## 2023-08-15 NOTE — MEDICAL/APP STUDENT
"Progress Note  Hospital Medicine    Provider team:    [facility] HOSP MED G  [facility] INFECTIOUS DISEASE CRITICAL CARE SERVICE  Admit Date: 8/11/2023  Encounter Date: 08/15/2023     SUBJECTIVE:     Follow-up Visit for: Bilateral epididymitis    HPI/Interval history (See H&P for complete P,F,SHx): ***     Review of Systems:  ROS    OBJECTIVE:     Intake/Output Summary (Last 24 hours) at 8/15/2023 0737  Last data filed at 8/15/2023 0012  Gross per 24 hour   Intake 100 ml   Output 1750 ml   Net -1650 ml     Vital Signs Range (Last 24H):  Temp:  [98.1 °F (36.7 °C)-99.8 °F (37.7 °C)]   Pulse:  []   Resp:  [16-18]   BP: (115-138)/(61-81)   SpO2:  [98 %-100 %]   Body mass index is 22.36 kg/m².    Objective:  Vital signs: (most recent): Blood pressure 124/76, pulse 95, temperature 98.7 °F (37.1 °C), temperature source Oral, resp. rate 18, height 5' 8" (1.727 m), weight 66.7 kg (147 lb 0.8 oz), SpO2 98 %.          Medications:  Medication list was reviewed in EPIC and changes noted under Assessment/Plan and MAR.    Laboratory:  Recent Labs     08/15/23  0352   WBC 10.16   RBC 3.37*   HGB 9.5*   HCT 29.7*   PLT 73*   MCV 88   MCH 28.2   MCHC 32.0   GRAN 56.1  5.7   LYMPH 35.5  3.6   MONO 6.5  0.7   EOS 0.1      Recent Labs     08/15/23  0352 08/15/23  0618   GLU 93  --    *  --    K 5.2* 5.3*     --    CO2 28  --    BUN 14  --    CREATININE 0.9  --    CALCIUM 8.9  --    ANIONGAP 7*  --    MG 1.9  --    PHOS 3.6  --        ASSESSMENT/PLAN:     Active Hospital Problems    Diagnosis  POA    *Bilateral epididymitis [N45.1]  Yes    Thrombocytopenia [D69.6]  Yes    Mouth lesion [K13.70]  Yes    Syphilis [A53.9]  Yes    Tobacco abuse [Z72.0]  Yes    Tobacco abuse counseling [Z71.6]  Not Applicable    Mass of right axilla [R22.31]  Yes    HIV (human immunodeficiency virus infection) [B20]  Yes    Diffuse lymphadenopathy [R59.1]  Yes    Rash of groin [R21]  Yes    Normocytic anemia [D64.9]  Yes      Resolved Hospital Problems "   No resolved problems to display.        ***    Anticipated discharge date and disposition:   Home; Pending test results and treatment    [facility]

## 2023-08-15 NOTE — PROGRESS NOTES
"[facility] - Observation [address] [place] [address] Medicine  Progress Note    Patient Name: Victorino Fisher  MRN: [id number]  Patient Class: IP- Inpatient   Admission Date: 8/11/2023  Length of Stay: 1 days  Attending Physician: Gwyn Mora MD  Primary Care Provider: Mahogany, Primary Doctor        Subjective:     Principal Problem:Bilateral epididymitis        HPI:  Victorino Fisher is a 32yo M with a PMH of HIV (not on HAART) and tobacco abuse who [place] the [facility] [place] on 8/11/23 with complaints of scrotal pain and pain in his right axilla. Pt reports an "ingrown hair" in his right armpit 3 weeks prior, which has allegedly enlarged into a mass over this short time pain; associated with occasional purulent discharge and pain with ROM. States he has not thought much of it. Also with 2-3 day history of BL testicular pain and scrotal swelling. Denies F/C/N/V/D/C, HA, SOB, CP, palpitations, abdominal pain, dysuria, extremity swelling, or symptoms otherwise.    In the ED, VSS. Labs remarkable for Hb 10.8, though were otherwise grossly asymptomatic. Scrotal US showed sonographic findings suggestive of bilateral epididymitis with scrotal wall thickening, bilateral groin masses measuring up to 3.4 cm which likely represent pathologic lymphadenopathy, bilateral epididymal cysts, punctate left testicular cystic focus too small to characterize, and bilateral varicoceles. ED gave doxy, rocephin, and vanc and consulted ID and derm. Hospital medicine was consulted for admission and further management.      Overview/Hospital Course:  Pt admitted to [facility] and started on empiric vanc and zosyn; doxy added per ID recs. Received IM rocephin in the ED for gonorrhea. Dermatology consulted for biopsy of right axillary lesion; path pending. RPR reactive, consistent with active syphilis, on doxy per ID. ENT consulted for oral lesion, biopsied on 8/14. Wound culture of right axillary mass growing S.aureus as a superimposed infection. Started on HAART by ID " on 8/15.      Interval History: Pt seen and examined this morning on aiden DANIELS. Pain remains controlled, discussed wound culture results and plan for hopeful discharge tomorrow. Care plan reviewed. Otherwise, doing well and with no further complaints at this time.      Objective:     Vital Signs (Most Recent):  Temp: 98.2 °F (36.8 °C) (08/15/23 0825)  Pulse: 94 (08/15/23 0825)  Resp: 18 (08/15/23 0927)  BP: 130/80 (08/15/23 0825)  SpO2: 100 % (08/15/23 0825) Vital Signs (24h Range):  Temp:  [98.2 °F (36.8 °C)-99.8 °F (37.7 °C)] 98.2 °F (36.8 °C)  Pulse:  [] 94  Resp:  [16-18] 18  SpO2:  [98 %-100 %] 100 %  BP: (118-138)/(61-81) 130/80     Weight: 66.7 kg (147 lb 0.8 oz)  Body mass index is 22.36 kg/m².    Intake/Output Summary (Last 24 hours) at 8/15/2023 0932  Last data filed at 8/15/2023 0012  Gross per 24 hour   Intake 100 ml   Output 1750 ml   Net -1650 ml         Physical Exam  Gen: in NAD, appears stated age; generalized LAD  Neuro: AAOx4, CN2-12 grossly intact BL; motor, sensory, and strength grossly intact BL  HEENT: NTNC, EOMI, PERRLA, MMM; no thyromegaly or lymphadenopathy; no JVD appreciated  CVS: RRR, no m/r/g; S1/S2 auscultated with no S3 or S4; capillary refill < 2 sec  Resp: lungs CTAB, no w/r/r; no belabored breathing or accessory muscle use appreciated   Abd: BS+ in all 4 quadrants; NTND, soft to palpation; no organomegaly appreciated  : testes and penile shaft edematous and tender to palpation, raise rash noted above the pubis (see below)  Extrem: pulses full, equal, and regular over all 4 extremities; no UE or LE edema BL; see images below of right axillae               Significant Labs: All pertinent labs within the past 24 hours have been reviewed.    Significant Imaging: I have reviewed all pertinent imaging results/findings within the past 24 hours.      Assessment/Plan:      * Bilateral epididymitis  - Interval history and physical exam findings as described above  - Scrotal US  findings reviewed  - S/p IM rocephin in the ED for gonorrhea  - Infectious workup pending  - On vanc, zosyn, and doxy per ID  - Afebrile, no leukocytosis  - ID following  - PRN analgesics provided  - Continuing to monitor    Mass of right axilla  - Interval history and physical exam findings as described above  - Suspect this has been developing longer than the 3 weeks he reported  - Infectious workup pending  - Superimposed S.aureus infection, on vanc  - S/p punch bx with derm on 8/11, path pending  - ID following    Rash of groin  - Interval history and physical exam findings as described above  - Infectious workup pending  - On vanc and zosyn  - Derm and ID following    Diffuse lymphadenopathy  - Likely related to infectious source in the setting of untreated HIV    HIV (human immunodeficiency virus infection)  - Noncompliant with HAART  - CD4 count and HIV viral load pending  - ID following    Syphilis  - RPR 1:4  - Continue doxy per ID    Mouth lesion  - Noted on derm eval  - Biopsied by ENT on 8/14, path results pending    Thrombocytopenia  - Noted since admission  - Monitoring on daily labs    Normocytic anemia  - H/H lower than previous on admission  - Will continue to monitor on daily labs    Tobacco abuse  - Pt with 0.5 ppd smoking history for most of adult life  - Currently without plans to quit  - Counseled on the importance of smoking cessation in relation to health     Tobacco abuse counseling  - As above      VTE Risk Mitigation (From admission, onward)         Ordered     Place sequential compression device  Until discontinued         08/11/23 1555     IP VTE LOW RISK PATIENT  Once         08/11/23 1555     Place sequential compression device  Until discontinued         08/11/23 1555                Discharge Planning   MAX: 8/16/2023     Code Status: Full Code   Is the patient medically ready for discharge?: No    Reason for patient still in hospital (select all that apply): Patient trending  condition  Discharge Plan A: Home            Gwyn Mora MD  Attending Physician  Medical Director - [facility]  Department of Hospital Medicine  8/15/2023

## 2023-08-15 NOTE — NURSING
Patient awake in bed. Room air. IV fluids infusing to PIV. Able to make needs known. Bed in low, locked position. Call light within reach. No apparent distress noted. Will continue POC.

## 2023-08-15 NOTE — SUBJECTIVE & OBJECTIVE
Interval History: Pt seen and examined this morning on aiden DANIELS. Pain remains controlled, discussed wound culture results and plan for hopeful discharge tomorrow. Care plan reviewed. Otherwise, doing well and with no further complaints at this time.      Objective:     Vital Signs (Most Recent):  Temp: 98.2 °F (36.8 °C) (08/15/23 0825)  Pulse: 94 (08/15/23 0825)  Resp: 18 (08/15/23 0927)  BP: 130/80 (08/15/23 0825)  SpO2: 100 % (08/15/23 0825) Vital Signs (24h Range):  Temp:  [98.2 °F (36.8 °C)-99.8 °F (37.7 °C)] 98.2 °F (36.8 °C)  Pulse:  [] 94  Resp:  [16-18] 18  SpO2:  [98 %-100 %] 100 %  BP: (118-138)/(61-81) 130/80     Weight: 66.7 kg (147 lb 0.8 oz)  Body mass index is 22.36 kg/m².    Intake/Output Summary (Last 24 hours) at 8/15/2023 0932  Last data filed at 8/15/2023 0012  Gross per 24 hour   Intake 100 ml   Output 1750 ml   Net -1650 ml         Physical Exam  Gen: in NAD, appears stated age; generalized LAD  Neuro: AAOx4, CN2-12 grossly intact BL; motor, sensory, and strength grossly intact BL  HEENT: NTNC, EOMI, PERRLA, MMM; no thyromegaly or lymphadenopathy; no JVD appreciated  CVS: RRR, no m/r/g; S1/S2 auscultated with no S3 or S4; capillary refill < 2 sec  Resp: lungs CTAB, no w/r/r; no belabored breathing or accessory muscle use appreciated   Abd: BS+ in all 4 quadrants; NTND, soft to palpation; no organomegaly appreciated  : testes and penile shaft edematous and tender to palpation, raise rash noted above the pubis (see below)  Extrem: pulses full, equal, and regular over all 4 extremities; no UE or LE edema BL; see images below of right axillae               Significant Labs: All pertinent labs within the past 24 hours have been reviewed.    Significant Imaging: I have reviewed all pertinent imaging results/findings within the past 24 hours.

## 2023-08-15 NOTE — ASSESSMENT & PLAN NOTE
- Interval history and physical exam findings as described above  - Suspect this has been developing longer than the 3 weeks he reported  - Infectious workup pending  - Superimposed S.aureus infection, on vanc  - S/p punch bx with derm on 8/11, path pending  - ID following

## 2023-08-15 NOTE — PROGRESS NOTES
"[facility] - [facility]  Infectious Disease  Progress Note    Patient Name: Victorino Fisher  MRN: [id number]  Admission Date: 8/11/2023  Length of Stay: 0 days  Attending Physician: Gwyn Mora MD  Primary Care Provider: No, Primary Doctor    Isolation Status: Contact and Airborne  Assessment/Plan:      ENT  Mouth lesion  S/p biopsy by ENT.  Will follow up on results.    Derm  Rash of groin  Suspect this is same process as axilla lesions.      Renal/  * Bilateral epididymitis  Most common causes in younger people would be gonorrhea and chlamydia.  Gonorrhea was positive.  He received a dose of IM ceftriaxone in the ED.    ID  Syphilis  RPR is positive at 1:4.  Patient is to continue on doxycycline.    HIV (human immunodeficiency virus infection)  CD4 count is 523.  Viral load is 46,874.    Plan    1. Will start the patient on biktarvy.  Will arrange follow up with me in ID clinic.    Other  Mass of right axilla  Multiple nodular lesions in his right axilla. Suspect this is same process as in his groin area.  CD4 count is in the 500s which rules out kaposi sarcoma and rules out bacillary angiomatosis.  Atypical hydradenitis suppurativa, atypical syphilis condyloma and atypical HPV mediated disease are possible.  Wound cultures by dermatology are positive for Staph aureus.    Plan    1. Continue IV vancomycin.    2. Discontinue zosyn.    3. Consider ultrasound or CT scan of the area to rule out deeper abscess.        Anticipated Disposition: TBD    Thank you for your consult. I will follow-up with patient. Please contact us if you have any additional questions.    Bryan Schafer MD  Infectious Disease  [facility] - Observation 11H    Subjective:     Principal Problem:Bilateral epididymitis    HPI: Mr. Fisher is a 33M with PMH of HIV (CD4 382 in May 2023), presents with L groin pain. Infectious disease consulted for "rash".     Patient states that he was diagnosed with HIV earlier this year, but has not been " "able to establish care with anyone and has not been on retrovirals. He states the lesions in the R underarm began as a couple small lesions in May, and have since progressed. He states they started "leaking" so he put some tissue/napkin on it. He states it is tender to palpation. He has some lesions in L groin and at the base of the penis as well, with some associated swelling.     Interval History: Continues to have pain.    Review of Systems   Skin:  Positive for color change, rash and wound.   All other systems reviewed and are negative.    Objective:     Vital Signs (Most Recent):  Temp: 99.8 °F (37.7 °C) (08/14/23 1638)  Pulse: 102 (08/14/23 1638)  Resp: 16 (08/14/23 1638)  BP: 130/81 (08/14/23 1638)  SpO2: 98 % (08/14/23 1638) Vital Signs (24h Range):  Temp:  [98 °F (36.7 °C)-99.8 °F (37.7 °C)] 99.8 °F (37.7 °C)  Pulse:  [] 102  Resp:  [16-18] 16  SpO2:  [98 %-100 %] 98 %  BP: (115-130)/(61-81) 130/81     Weight: 66.7 kg (147 lb 0.8 oz)  Body mass index is 22.36 kg/m².    Estimated Creatinine Clearance: 123.9 mL/min (based on SCr of 0.8 mg/dL).     Physical Exam  Vitals reviewed.   Constitutional:       General: He is not in acute distress.     Appearance: Normal appearance. He is not ill-appearing.   HENT:      Head: Normocephalic and atraumatic.   Eyes:      Extraocular Movements: Extraocular movements intact.      Conjunctiva/sclera: Conjunctivae normal.   Cardiovascular:      Rate and Rhythm: Normal rate and regular rhythm.      Heart sounds: No murmur heard.  Pulmonary:      Effort: Pulmonary effort is normal. No respiratory distress.      Breath sounds: Normal breath sounds.   Abdominal:      General: Abdomen is flat. Bowel sounds are normal.      Palpations: Abdomen is soft.      Tenderness: There is no abdominal tenderness.   Genitourinary:     Comments: Small flesh colored lesions in L groin and base of penis  Musculoskeletal:      Cervical back: Normal range of motion.      Comments: Numerous " flesh colored lesions in R underarm, tender to palpation, no active drainage   Skin:     General: Skin is warm and dry.   Neurological:      General: No focal deficit present.      Mental Status: He is alert and oriented to person, place, and time.   Psychiatric:         Mood and Affect: Mood normal.         Behavior: Behavior normal.         Thought Content: Thought content normal.          Significant Labs:   Microbiology Results (last 7 days)       Procedure Component Value Units Date/Time    AFB Culture & Smear [[id number]] Collected: 08/12/23 1616    Order Status: Completed Specimen: Biopsy from Chest, Right Updated: 08/14/23 1618     AFB Culture & Smear Culture in progress     AFB CULTURE STAIN No acid fast bacilli seen.    Aerobic culture [[id number]]  (Abnormal) Collected: 08/12/23 1616    Order Status: Completed Specimen: Biopsy from Chest, Right Updated: 08/14/23 1117     Aerobic Bacterial Culture STAPHYLOCOCCUS AUREUS  Rare  Susceptibility pending  No other significant isolate      Culture, Anaerobe [[id number]] Collected: 08/12/23 1616    Order Status: Completed Specimen: Biopsy from Chest, Right Updated: 08/14/23 0457     Anaerobic Culture Culture in progress    Fungus culture [[id number]] Collected: 08/12/23 1616    Order Status: Sent Specimen: Biopsy from Chest, Right Updated: 08/12/23 1654    C. trachomatis/N. gonorrhoeae by AMP DNA [facility]; Urine [[id number]]  (Abnormal) Collected: 08/11/23 1422    Order Status: Completed Specimen: Genital Updated: 08/12/23 1622     Chlamydia, Amplified DNA Not Detected     N gonorrhoeae, amplified DNA Detected    Narrative:      Sources by Resulting Lab:->[facility]  Release to patient->Immediate    C. trachomatis/N. gonorrhoeae by AMP DNA [facility]; Urethra [[id number]] Collected: 08/11/23 1413    Order Status: Sent Specimen: Genital Updated: 08/11/23 1413            Significant Imaging: I have reviewed all pertinent imaging results/findings within the past 24 hours.

## 2023-08-15 NOTE — SUBJECTIVE & OBJECTIVE
Interval History: Continues to have pain.    Review of Systems   Skin:  Positive for color change, rash and wound.   All other systems reviewed and are negative.    Objective:     Vital Signs (Most Recent):  Temp: 99.8 °F (37.7 °C) (08/14/23 1638)  Pulse: 102 (08/14/23 1638)  Resp: 16 (08/14/23 1638)  BP: 130/81 (08/14/23 1638)  SpO2: 98 % (08/14/23 1638) Vital Signs (24h Range):  Temp:  [98 °F (36.7 °C)-99.8 °F (37.7 °C)] 99.8 °F (37.7 °C)  Pulse:  [] 102  Resp:  [16-18] 16  SpO2:  [98 %-100 %] 98 %  BP: (115-130)/(61-81) 130/81     Weight: 66.7 kg (147 lb 0.8 oz)  Body mass index is 22.36 kg/m².    Estimated Creatinine Clearance: 123.9 mL/min (based on SCr of 0.8 mg/dL).     Physical Exam  Vitals reviewed.   Constitutional:       General: He is not in acute distress.     Appearance: Normal appearance. He is not ill-appearing.   HENT:      Head: Normocephalic and atraumatic.   Eyes:      Extraocular Movements: Extraocular movements intact.      Conjunctiva/sclera: Conjunctivae normal.   Cardiovascular:      Rate and Rhythm: Normal rate and regular rhythm.      Heart sounds: No murmur heard.  Pulmonary:      Effort: Pulmonary effort is normal. No respiratory distress.      Breath sounds: Normal breath sounds.   Abdominal:      General: Abdomen is flat. Bowel sounds are normal.      Palpations: Abdomen is soft.      Tenderness: There is no abdominal tenderness.   Genitourinary:     Comments: Small flesh colored lesions in L groin and base of penis  Musculoskeletal:      Cervical back: Normal range of motion.      Comments: Numerous flesh colored lesions in R underarm, tender to palpation, no active drainage   Skin:     General: Skin is warm and dry.   Neurological:      General: No focal deficit present.      Mental Status: He is alert and oriented to person, place, and time.   Psychiatric:         Mood and Affect: Mood normal.         Behavior: Behavior normal.         Thought Content: Thought content normal.           Significant Labs:   Microbiology Results (last 7 days)       Procedure Component Value Units Date/Time    AFB Culture & Smear [[id number]] Collected: 08/12/23 1616    Order Status: Completed Specimen: Biopsy from Chest, Right Updated: 08/14/23 1618     AFB Culture & Smear Culture in progress     AFB CULTURE STAIN No acid fast bacilli seen.    Aerobic culture [[id number]]  (Abnormal) Collected: 08/12/23 1616    Order Status: Completed Specimen: Biopsy from Chest, Right Updated: 08/14/23 1117     Aerobic Bacterial Culture STAPHYLOCOCCUS AUREUS  Rare  Susceptibility pending  No other significant isolate      Culture, Anaerobe [[id number]] Collected: 08/12/23 1616    Order Status: Completed Specimen: Biopsy from Chest, Right Updated: 08/14/23 0457     Anaerobic Culture Culture in progress    Fungus culture [[id number]] Collected: 08/12/23 1616    Order Status: Sent Specimen: Biopsy from Chest, Right Updated: 08/12/23 1654    C. trachomatis/N. gonorrhoeae by AMP DNA [facility]; Urine [[id number]]  (Abnormal) Collected: 08/11/23 1422    Order Status: Completed Specimen: Genital Updated: 08/12/23 1622     Chlamydia, Amplified DNA Not Detected     N gonorrhoeae, amplified DNA Detected    Narrative:      Sources by Resulting Lab:->[facility]  Release to patient->Immediate    C. trachomatis/N. gonorrhoeae by AMP DNA [facility]; Urethra [[id number]] Collected: 08/11/23 1413    Order Status: Sent Specimen: Genital Updated: 08/11/23 1413            Significant Imaging: I have reviewed all pertinent imaging results/findings within the past 24 hours.

## 2023-08-15 NOTE — ASSESSMENT & PLAN NOTE
Multiple nodular lesions in his right axilla. Suspect this is same process as in his groin area.  CD4 count is in the 500s which rules out kaposi sarcoma and rules out bacillary angiomatosis.  Atypical hydradenitis suppurativa, atypical syphilis condyloma and atypical HPV mediated disease are possible.  Wound cultures by dermatology are positive for Staph aureus.    Plan    1. Continue IV vancomycin.    2. Discontinue zosyn.    3. Consider ultrasound or CT scan of the area to rule out deeper abscess.

## 2023-08-15 NOTE — PROGRESS NOTES
Pharmacokinetic Assessment Follow Up: IV Vancomycin    Vancomycin serum concentration assessment(s):    The trough level was drawn correctly and can be used to guide therapy at this time. The measurement is within the desired definitive target range of 10 to 20 mcg/mL.    Vancomycin Regimen Plan:    Change regimen to Vancomycin 1250 mg IV every 12 hours with next serum trough concentration measured at 1600 prior to 4th dose on 08/16/23    Drug levels (last 3 results):  Recent Labs   Lab Result Units 08/13/23  0957 08/15/23  0352   Vancomycin-Trough ug/mL 9.6* 11.0       Pharmacy will continue to follow and monitor vancomycin.    Please contact pharmacy at extension [phone] for questions regarding this assessment.    Thank you for the consult,   Amanda Marroquin       Patient brief summary:  Victorino Fisher is a 33 y.o. male initiated on antimicrobial therapy with IV Vancomycin for treatment of skin & soft tissue infection        Drug Allergies:   Review of patient's allergies indicates:  No Known Allergies    Actual Body Weight:   66.7 kg    Renal Function:   Estimated Creatinine Clearance: 123.9 mL/min (based on SCr of 0.8 mg/dL).,     Dialysis Method (if applicable):  N/A    CBC (last 72 hours):  Recent Labs   Lab Result Units 08/12/23  0520 08/13/23  0957 08/14/23  0749 08/15/23  0352   WBC K/uL 7.51 7.94 8.34 10.16   Hemoglobin g/dL 9.1* 9.3* 9.4* 9.5*   Hematocrit % 28.8* 30.1* 29.4* 29.7*   Platelets K/uL 63* 66* 68* 73*   Gran % % 57.6 66.4 63.5 56.1   Lymph % % 33.0 26.6 27.6 35.5   Mono % % 6.7 5.0 6.7 6.5   Eosinophil % % 2.0 1.1 1.2 0.9   Basophil % % 0.3 0.5 0.5 0.6   Differential Method  Automated Automated Automated Automated       Metabolic Panel (last 72 hours):  Recent Labs   Lab Result Units 08/12/23  0520 08/13/23  0957 08/14/23  0749   Sodium mmol/L 134* 134* 135*   Potassium mmol/L 4.1 4.3 4.2   Chloride mmol/L 101 98 99   CO2 mmol/L 21* 27 27   Glucose mg/dL 101 126* 92   BUN mg/dL 11 12 12    Creatinine mg/dL 0.7 0.8 0.8   Albumin g/dL 2.8* 2.9* 2.8*   Total Bilirubin mg/dL 0.5 0.5 0.6   Alkaline Phosphatase U/L 65 76 80   AST U/L 27 37 37   ALT U/L 11 23 23   Magnesium mg/dL 1.6 1.8 1.8   Phosphorus mg/dL 3.9 4.3 3.8       Vancomycin Administrations:  vancomycin given in the last 96 hours                     vancomycin (VANCOCIN) 1,000 mg in dextrose 5 % (D5W) 250 mL IVPB (Vial-Mate) (mg) 1,000 mg New Bag 08/14/23 1658     1,000 mg New Bag  0551     1,000 mg New Bag 08/13/23 1641     1,000 mg New Bag 08/12/23 2126     1,000 mg New Bag  0853    vancomycin (VANCOCIN) 500 mg in dextrose 5 % in water (D5W) 100 mL IVPB (MB+) (mg) 500 mg New Bag 08/11/23 1735    vancomycin (VANCOCIN) 1,000 mg in dextrose 5 % (D5W) 250 mL IVPB (Vial-Mate) (mg) 1,000 mg New Bag 08/11/23 1607                    Microbiologic Results:  Microbiology Results (last 7 days)       Procedure Component Value Units Date/Time    AFB Culture & Smear [[id number]] Collected: 08/12/23 1616    Order Status: Completed Specimen: Biopsy from Chest, Right Updated: 08/14/23 1618     AFB Culture & Smear Culture in progress     AFB CULTURE STAIN No acid fast bacilli seen.    Aerobic culture [[id number]]  (Abnormal) Collected: 08/12/23 1616    Order Status: Completed Specimen: Biopsy from Chest, Right Updated: 08/14/23 1117     Aerobic Bacterial Culture STAPHYLOCOCCUS AUREUS  Rare  Susceptibility pending  No other significant isolate      Culture, Anaerobe [[id number]] Collected: 08/12/23 1616    Order Status: Completed Specimen: Biopsy from Chest, Right Updated: 08/14/23 0457     Anaerobic Culture Culture in progress    Fungus culture [[id number]] Collected: 08/12/23 1616    Order Status: Sent Specimen: Biopsy from Chest, Right Updated: 08/12/23 1654    C. trachomatis/N. gonorrhoeae by AMP DNA [facility]; Urine [[id number]]  (Abnormal) Collected: 08/11/23 1422    Order Status: Completed Specimen: Genital Updated: 08/12/23 1622     Chlamydia, Amplified DNA  Not Detected     N gonorrhoeae, amplified DNA Detected    Narrative:      Sources by Resulting Lab:->[facility]  Release to patient->Immediate    C. trachomatis/N. gonorrhoeae by AMP DNA [facility]; Urethra [[id number]] Collected: 08/11/23 1413    Order Status: Sent Specimen: Genital Updated: 08/11/23 1413

## 2023-08-15 NOTE — PLAN OF CARE
Problem: Adult Inpatient Plan of Care  Goal: Absence of Hospital-Acquired Illness or Injury  Outcome: Ongoing, Progressing  Goal: Optimal Comfort and Wellbeing  Outcome: Ongoing, Progressing     Problem: Infection  Goal: Absence of Infection Signs and Symptoms  Outcome: Ongoing, Progressing     Problem: Pain Acute  Goal: Acceptable Pain Control and Functional Ability  Outcome: Ongoing, Progressing     Pain well controlled with pain medication per MAR.

## 2023-08-15 NOTE — ASSESSMENT & PLAN NOTE
CD4 count is 523.  Viral load is 46,874.    Plan    1. Will start the patient on biktarvy.  Will arrange follow up with me in ID clinic.

## 2023-08-16 ENCOUNTER — SPECIALTY PHARMACY (OUTPATIENT)
Dept: PHARMACY | Facility: CLINIC | Age: 33
End: 2023-08-16
Payer: MEDICAID

## 2023-08-16 ENCOUNTER — TELEPHONE (OUTPATIENT)
Dept: PHARMACY | Facility: CLINIC | Age: 33
End: 2023-08-16
Payer: MEDICAID

## 2023-08-16 VITALS
TEMPERATURE: 98 F | RESPIRATION RATE: 18 BRPM | SYSTOLIC BLOOD PRESSURE: 127 MMHG | HEART RATE: 86 BPM | HEIGHT: 68 IN | OXYGEN SATURATION: 97 % | BODY MASS INDEX: 22.29 KG/M2 | WEIGHT: 147.06 LBS | DIASTOLIC BLOOD PRESSURE: 80 MMHG

## 2023-08-16 DIAGNOSIS — B20 SYMPTOMATIC HIV INFECTION: Primary | ICD-10-CM

## 2023-08-16 LAB
ALBUMIN SERPL BCP-MCNC: 2.8 G/DL (ref 3.5–5.2)
ALP SERPL-CCNC: 94 U/L (ref 55–135)
ALT SERPL W/O P-5'-P-CCNC: 48 U/L (ref 10–44)
ANION GAP SERPL CALC-SCNC: 7 MMOL/L (ref 8–16)
AST SERPL-CCNC: 65 U/L (ref 10–40)
BASOPHILS # BLD AUTO: 0.06 K/UL (ref 0–0.2)
BASOPHILS NFR BLD: 0.6 % (ref 0–1.9)
BILIRUB SERPL-MCNC: 0.5 MG/DL (ref 0.1–1)
BUN SERPL-MCNC: 18 MG/DL (ref 6–20)
CALCIUM SERPL-MCNC: 8.8 MG/DL (ref 8.7–10.5)
CHLORIDE SERPL-SCNC: 100 MMOL/L (ref 95–110)
CO2 SERPL-SCNC: 26 MMOL/L (ref 23–29)
CREAT SERPL-MCNC: 0.8 MG/DL (ref 0.5–1.4)
DIFFERENTIAL METHOD: ABNORMAL
EOSINOPHIL # BLD AUTO: 0.1 K/UL (ref 0–0.5)
EOSINOPHIL NFR BLD: 1 % (ref 0–8)
ERYTHROCYTE [DISTWIDTH] IN BLOOD BY AUTOMATED COUNT: 13.8 % (ref 11.5–14.5)
EST. GFR  (NO RACE VARIABLE): >60 ML/MIN/1.73 M^2
GLUCOSE SERPL-MCNC: 106 MG/DL (ref 70–110)
HCT VFR BLD AUTO: 28.5 % (ref 40–54)
HGB BLD-MCNC: 9.2 G/DL (ref 14–18)
IMM GRANULOCYTES # BLD AUTO: 0.06 K/UL (ref 0–0.04)
IMM GRANULOCYTES NFR BLD AUTO: 0.6 % (ref 0–0.5)
LYMPHOCYTES # BLD AUTO: 2.8 K/UL (ref 1–4.8)
LYMPHOCYTES NFR BLD: 28.7 % (ref 18–48)
Lab: NOT DETECTED
MAGNESIUM SERPL-MCNC: 1.7 MG/DL (ref 1.6–2.6)
MCH RBC QN AUTO: 28.4 PG (ref 27–31)
MCHC RBC AUTO-ENTMCNC: 32.3 G/DL (ref 32–36)
MCV RBC AUTO: 88 FL (ref 82–98)
MONOCYTES # BLD AUTO: 0.6 K/UL (ref 0.3–1)
MONOCYTES NFR BLD: 5.9 % (ref 4–15)
NEUTROPHILS # BLD AUTO: 6.1 K/UL (ref 1.8–7.7)
NEUTROPHILS NFR BLD: 63.2 % (ref 38–73)
NRBC BLD-RTO: 0 /100 WBC
ORTHOPOXVIRUS, PCR: NOT DETECTED
PHOSPHATE SERPL-MCNC: 3.2 MG/DL (ref 2.7–4.5)
PLATELET # BLD AUTO: 66 K/UL (ref 150–450)
PMV BLD AUTO: 11.8 FL (ref 9.2–12.9)
POTASSIUM SERPL-SCNC: 4.5 MMOL/L (ref 3.5–5.1)
PROT SERPL-MCNC: 6.4 G/DL (ref 6–8.4)
RBC # BLD AUTO: 3.24 M/UL (ref 4.6–6.2)
SODIUM SERPL-SCNC: 133 MMOL/L (ref 136–145)
WBC # BLD AUTO: 9.67 K/UL (ref 3.9–12.7)

## 2023-08-16 PROCEDURE — 85025 COMPLETE CBC W/AUTO DIFF WBC: CPT | Performed by: STUDENT IN AN ORGANIZED HEALTH CARE EDUCATION/TRAINING PROGRAM

## 2023-08-16 PROCEDURE — 80053 COMPREHEN METABOLIC PANEL: CPT | Performed by: STUDENT IN AN ORGANIZED HEALTH CARE EDUCATION/TRAINING PROGRAM

## 2023-08-16 PROCEDURE — 99239 HOSP IP/OBS DSCHRG MGMT >30: CPT | Mod: ,,, | Performed by: STUDENT IN AN ORGANIZED HEALTH CARE EDUCATION/TRAINING PROGRAM

## 2023-08-16 PROCEDURE — 25000003 PHARM REV CODE 250: Performed by: STUDENT IN AN ORGANIZED HEALTH CARE EDUCATION/TRAINING PROGRAM

## 2023-08-16 PROCEDURE — 63600175 PHARM REV CODE 636 W HCPCS: Performed by: STUDENT IN AN ORGANIZED HEALTH CARE EDUCATION/TRAINING PROGRAM

## 2023-08-16 PROCEDURE — 99239 PR HOSPITAL DISCHARGE DAY,>30 MIN: ICD-10-PCS | Mod: ,,, | Performed by: STUDENT IN AN ORGANIZED HEALTH CARE EDUCATION/TRAINING PROGRAM

## 2023-08-16 PROCEDURE — 36415 COLL VENOUS BLD VENIPUNCTURE: CPT | Performed by: STUDENT IN AN ORGANIZED HEALTH CARE EDUCATION/TRAINING PROGRAM

## 2023-08-16 PROCEDURE — 84100 ASSAY OF PHOSPHORUS: CPT | Performed by: STUDENT IN AN ORGANIZED HEALTH CARE EDUCATION/TRAINING PROGRAM

## 2023-08-16 PROCEDURE — 25000003 PHARM REV CODE 250: Performed by: INTERNAL MEDICINE

## 2023-08-16 PROCEDURE — 83735 ASSAY OF MAGNESIUM: CPT | Performed by: STUDENT IN AN ORGANIZED HEALTH CARE EDUCATION/TRAINING PROGRAM

## 2023-08-16 RX ORDER — DOXYCYCLINE HYCLATE 100 MG
100 TABLET ORAL EVERY 12 HOURS
Qty: 180 TABLET | Refills: 0 | Status: SHIPPED | OUTPATIENT
Start: 2023-08-16 | End: 2023-11-14

## 2023-08-16 RX ORDER — OXYCODONE AND ACETAMINOPHEN 10; 325 MG/1; MG/1
1 TABLET ORAL EVERY 6 HOURS PRN
Qty: 20 TABLET | Refills: 0 | Status: SHIPPED | OUTPATIENT
Start: 2023-08-16 | End: 2023-08-21

## 2023-08-16 RX ADMIN — VANCOMYCIN HYDROCHLORIDE 1250 MG: 1.25 INJECTION, POWDER, LYOPHILIZED, FOR SOLUTION INTRAVENOUS at 04:08

## 2023-08-16 RX ADMIN — OXYCODONE AND ACETAMINOPHEN 1 TABLET: 10; 325 TABLET ORAL at 04:08

## 2023-08-16 RX ADMIN — OXYCODONE AND ACETAMINOPHEN 1 TABLET: 10; 325 TABLET ORAL at 08:08

## 2023-08-16 RX ADMIN — DOXYCYCLINE HYCLATE 100 MG: 100 TABLET, COATED ORAL at 08:08

## 2023-08-16 NOTE — TELEPHONE ENCOUNTER
Specialty Pharmacy - Initial Clinical Assessment    Specialty Medication Orders Linked to Encounter      Flowsheet Row Most Recent Value   Medication #1 dpxvnhjng-yxiytimd-stfknnb ala (BIKTARVY) -25 mg (25 kg or greater) (Order#[id number], Rx#[id number])          Patient Diagnosis   B20 - HIV (human immunodeficiency virus infection)    Subjective    Victorino Fisher is a 33 y.o. male, who is followed by the specialty pharmacy service for management and education.    Recent Encounters       Date Type Provider Description    08/16/2023 Specialty Pharmacy Silvino Johnson, PharmD Initial Clinical Assessment    08/16/2023 Specialty Pharmacy Silvino Johnson, PharmD Referral Authorization            Current Outpatient Medications   Medication Sig    qfqsminyu-jtgaqjgv-mdileyo ala (BIKTARVY) -25 mg (25 kg or greater) Take 1 tablet by mouth once daily.    doxycycline (VIBRA-TABS) 100 MG tablet Take 1 tablet (100 mg total) by mouth every 12 (twelve) hours.    oxyCODONE-acetaminophen (PERCOCET)  mg per tablet Take 1 tablet by mouth every 6 (six) hours as needed for Pain.   Last reviewed on 8/11/2023 10:32 AM by Flores Soto, RN    Review of patient's allergies indicates:  No Known AllergiesLast reviewed on  8/11/2023 10:32 AM by Flores Soto    Drug Interactions    Drug interactions evaluated: yes  Clinically relevant drug interactions identified: no      Provided the patient with educational material regarding drug interactions: not applicable                 Assessment Questions - Documented Responses      Flowsheet Row Most Recent Value   Assessment    Medication Reconciliation completed for patient No   During the past 4 weeks, has patient missed any activities due to condition or medication? No   During the past 4 weeks, did patient have any of the following urgent care visits? None   Goals of Therapy Status Partially achieving  [continuation upon hospital discharge]   Status of the patients ability to  "self-administer: Is Able   All education points have been covered with patient? No, patient declined- printed education provided   Welcome packet contents reviewed and discussed with patient? Yes   Assesment completed? Yes   Plan Therapy continued   Do you need to open a clinical intervention (i-vent)? No   Do you want to schedule first shipment? Yes   Medication #1 Assessment Info    Patient status Existing medication, New to [facility]   Is this medication appropriate for the patient? Yes   Is this medication effective? Not yet started          Refill Questions - Documented Responses      Flowsheet Row Most Recent Value   Patient Availability and HIPAA Verification    Does patient want to proceed with activity? Yes   HIPAA/medical authority confirmed? Yes   Relationship to patient of person spoken to? Self   Refill Screening Questions    When does the patient need to receive the medication? 08/17/23   Refill Delivery Questions    How will the patient receive the medication? Pickup   When does the patient need to receive the medication? 08/17/23   Expected Copay ($) 3   Is the patient able to afford the medication copay? Yes   Payment Method at pickup   Days supply of Refill 30   Supplies needed? No supplies needed   Refill activity completed? Yes   Refill activity plan Refill scheduled   Shipment/Pickup Date: 08/16/23            Objective    He has a past medical history of Anxiety, Asthma, Depression, and Human immunodeficiency virus (HIV) disease.    Tried/failed medications:     BP Readings from Last 4 Encounters:   08/16/23 127/80   05/10/23 (!) 156/107   09/27/22 (!) 177/111   08/07/22 (!) 159/106     Ht Readings from Last 4 Encounters:   08/11/23 5' 8" (1.727 m)   05/10/23 5' 8" (1.727 m)   09/27/22 5' 8" (1.727 m)   08/07/22 5' 8" (1.727 m)     Wt Readings from Last 4 Encounters:   08/11/23 66.7 kg (147 lb 0.8 oz)   05/10/23 66.7 kg (147 lb)   09/27/22 70.8 kg (156 lb)   08/07/22 72.6 kg (160 lb)     No results " "found for: "[id number]"  Recent Labs   Lab Result Units 08/16/23  0516 08/15/23  0352 08/14/23  0749 08/13/23  1158 08/13/23  0957 08/12/23  0520 08/11/23  1054   Creatinine mg/dL 0.8 0.9 0.8  --  0.8   < > 0.8   ALT U/L 48 H 29 23  --  23   < > 15   AST U/L 65 H 45 H 37  --  37   < > 25   Absolute CD4 cells/ul  --   --   --  523  --   --  485    < > = values in this interval not displayed.     The goals of treatment for Human Immunodeficiency Disease (HIV) treatment include:  Reducing HIV-associated morbidity and mortality  Restoring and preserving immunologic function  Suppressing and maintaining HIV viral load to undetectable levels  Preventing HIV transmission  Improving or maintaining quality of life  Maintaining optimal therapy adherence  Minimizing and managing side effects  Promoting adequate nutrition  Encouraging proper hydration    Goals of Therapy Status: Partially achieving (continuation upon hospital discharge)    Assessment/Plan  Patient plans to continue therapy without changes      Indication, dosage, appropriateness, effectiveness, safety and convenience of his specialty medication(s) were reviewed today.     Patient Education   Pharmacist offer to  patient was declined. Printed educational materials will be provided with medication.  Patient did accept verbal education on the following topics:  · Expectations and possible outcomes of therapy        Tasks added this encounter   5/16/2024 - Clinical Assessment (1 year recurrence)  8/17/2023 - Pickup Reminder   Tasks due within next 3 months   No tasks due.     Silvino Johnson, PharmD  [facility]  [address] [place] [id number]  Phone: [phone]  Fax: [phone]  "

## 2023-08-16 NOTE — PROGRESS NOTES
"[facility]  Infectious Disease  Progress Note    Patient Name: Victorino Fisher  MRN: [id number]  Admission Date: 8/11/2023  Length of Stay: 1 days  Attending Physician: Gwyn Mora MD  Primary Care Provider: No, Primary Doctor    Isolation Status: Contact and Airborne  Assessment/Plan:      ENT  Mouth lesion  S/p biopsy by ENT.  Will follow up on results.    Derm  Rash of groin  Suspect this is same process as axilla lesions.      Renal/  * Bilateral epididymitis  Likely secondary to gonorrhea.  Patient is s/p IM ceftriaxone.    ID  Syphilis  RPR is positive at 1:4.  Patient is to continue on doxycycline.    HIV (human immunodeficiency virus infection)  CD4 count is 523.  Viral load is 46,874.    Plan    1. Patient started on biktarvy.    2. Will arrange follow up in ID clinic.    Other  Mass of right axilla  Multiple nodular lesions in his right axilla. Suspect this is same process as in his groin area.  CD4 count is in the 500s which rules out kaposi sarcoma and rules out bacillary angiomatosis.  Atypical hydradenitis suppurativa, atypical syphilis condyloma and atypical HPV mediated disease are possible.  Wound cultures by dermatology are positive for Staph aureus.    Plan    1. IV vancomycin for now.  Will plan for doxycycline alone when he is outpatient.    2. Ultrasound of right axilla area.    3. Plastic surgery consult for consideration or removal of the affected area.    4. Follow up pathology report from biopsy.        Anticipated Disposition: TBD    Thank you for your consult. I will follow-up with patient. Please contact us if you have any additional questions.    Bryan Schafer MD  Infectious Disease  [facility]    Subjective:     Principal Problem:Bilateral epididymitis    HPI: Mr. Fisher is a 33M with PMH of HIV (CD4 382 in May 2023), presents with L groin pain. Infectious disease consulted for "rash".     Patient states that he was diagnosed with HIV earlier " "this year, but has not been able to establish care with anyone and has not been on retrovirals. He states the lesions in the R underarm began as a couple small lesions in May, and have since progressed. He states they started "leaking" so he put some tissue/napkin on it. He states it is tender to palpation. He has some lesions in L groin and at the base of the penis as well, with some associated swelling.     Interval History: No adverse events    Review of Systems   Skin:  Positive for color change, rash and wound.   All other systems reviewed and are negative.    Objective:     Vital Signs (Most Recent):  Temp: 98.7 °F (37.1 °C) (08/15/23 1628)  Pulse: 107 (08/15/23 1628)  Resp: 18 (08/15/23 1857)  BP: 138/77 (08/15/23 1628)  SpO2: 98 % (08/15/23 1628) Vital Signs (24h Range):  Temp:  [97.8 °F (36.6 °C)-98.7 °F (37.1 °C)] 98.7 °F (37.1 °C)  Pulse:  [] 107  Resp:  [16-18] 18  SpO2:  [98 %-100 %] 98 %  BP: (123-138)/(70-80) 138/77     Weight: 66.7 kg (147 lb 0.8 oz)  Body mass index is 22.36 kg/m².    Estimated Creatinine Clearance: 110.1 mL/min (based on SCr of 0.9 mg/dL).     Physical Exam  Vitals reviewed.   Constitutional:       General: He is not in acute distress.     Appearance: Normal appearance. He is not ill-appearing.   HENT:      Head: Normocephalic and atraumatic.   Eyes:      Extraocular Movements: Extraocular movements intact.      Conjunctiva/sclera: Conjunctivae normal.   Cardiovascular:      Rate and Rhythm: Normal rate and regular rhythm.      Heart sounds: No murmur heard.  Pulmonary:      Effort: Pulmonary effort is normal. No respiratory distress.      Breath sounds: Normal breath sounds.   Abdominal:      General: Abdomen is flat. Bowel sounds are normal.      Palpations: Abdomen is soft.      Tenderness: There is no abdominal tenderness.   Genitourinary:     Comments: Small flesh colored lesions in L groin and base of penis  Musculoskeletal:      Cervical back: Normal range of motion. "      Comments: Numerous flesh colored lesions in R underarm, tender to palpation, no active drainage   Skin:     General: Skin is warm and dry.   Neurological:      General: No focal deficit present.      Mental Status: He is alert and oriented to person, place, and time.   Psychiatric:         Mood and Affect: Mood normal.         Behavior: Behavior normal.         Thought Content: Thought content normal.          Significant Labs:   Microbiology Results (last 7 days)       Procedure Component Value Units Date/Time    Aerobic culture [[id number]]  (Abnormal)  (Susceptibility) Collected: 08/12/23 1616    Order Status: Completed Specimen: Biopsy from Chest, Right Updated: 08/15/23 1013     Aerobic Bacterial Culture METHICILLIN RESISTANT STAPHYLOCOCCUS AUREUS  Rare  No other significant isolate      AFB Culture & Smear [[id number]] Collected: 08/12/23 1616    Order Status: Completed Specimen: Biopsy from Chest, Right Updated: 08/14/23 1618     AFB Culture & Smear Culture in progress     AFB CULTURE STAIN No acid fast bacilli seen.    Culture, Anaerobe [[id number]] Collected: 08/12/23 1616    Order Status: Completed Specimen: Biopsy from Chest, Right Updated: 08/14/23 0457     Anaerobic Culture Culture in progress    Fungus culture [[id number]] Collected: 08/12/23 1616    Order Status: Sent Specimen: Biopsy from Chest, Right Updated: 08/12/23 1654    C. trachomatis/N. gonorrhoeae by AMP DNA [facility]; Urine [[id number]]  (Abnormal) Collected: 08/11/23 1422    Order Status: Completed Specimen: Genital Updated: 08/12/23 1622     Chlamydia, Amplified DNA Not Detected     N gonorrhoeae, amplified DNA Detected    Narrative:      Sources by Resulting Lab:->[facility]  Release to patient->Immediate    C. trachomatis/N. gonorrhoeae by AMP DNA [facility]; Urethra [[id number]] Collected: 08/11/23 1413    Order Status: Sent Specimen: Genital Updated: 08/11/23 1413            Significant Imaging: I have reviewed all pertinent imaging  results/findings within the past 24 hours.

## 2023-08-16 NOTE — PLAN OF CARE
[facility]  Discharge Final Note    Primary Care Provider: [place], Primary Doctor    Expected Discharge Date: 8/16/2023    Patient discharged to home via personal transportation.     Patient's bedside nurse and patient notified of the above.      Final Discharge Note (most recent)       Final Note - 08/16/23 1345          Final Note    Assessment Type Final Discharge Note (P)      Anticipated Discharge Disposition Home or Self Care (P)         Post-Acute Status    Post-Acute Authorization Other (P)      Other Status No Post-Acute Service Needs (P)                      Important Message from Medicare             Contact Info       [facility]    [address]  [facility] [address]   Phone: [phone]       Next Steps: Go on 8/19/2023    Instructions: Hospital Follow Up with Dr. Aquiles Rojo at 12:00PM; Bring Discharge Summary, ID, Insurance Card, and Medication List. Arrive 30 minutes prior to appointment in order to complete paperwork    [facility]    [phone]       Next Steps: Call    Instructions: Release of Information in order to obtain a copy of your medical records.    [facility]   Specialty: Internal Medicine, Family Medicine    [address] [place] [address]   Phone: [phone]       Next Steps: Schedule an appointment as soon as possible for a visit    Instructions: Contact office to establish care at their facility.    [facility] - Infectious Disease/Hop   Specialty: Infectious Diseases    [address] [facility] [address]   Phone: [phone]       Next Steps: Follow up    Instructions: SW fax ambulatory referral to [facility] for nurse to call and schedule follow up appointment; however, if you do not hear from someone within 48 hours contact the number listed.    [facility] - Allergy & Dermatology   Specialty: Allergy, Dermatology    [address] [facility] [address]   Phone:  [phone]       Next Steps: Follow up    Instructions: VON fax ambulatory referral to [facility] for nurse to call and schedule follow up appointment; however, if you do not hear from someone within 48 hours contact the number listed.    [facility] - Trauma/General Surgery   Specialty: Trauma Surgery, General Surgery, Surgery    [address] [facility] [address]   Phone: [phone]       Next Steps: Follow up    Instructions: VON fax ambulatory referral to [facility] for nurse to call and schedule follow up appointment; however, if you do not hear from someone within 48 hours contact the number listed.            Future Appointments   Date Time Provider Department Center   9/14/2023  9:30 AM Bryan Schafer MD [facility] ID [facility]       VON scheduled post-discharge follow-up appointment and information added to AVS.     Yamila Cortes LMSW  [facility]  Ext. [phone]

## 2023-08-16 NOTE — SUBJECTIVE & OBJECTIVE
Interval History: No adverse events    Review of Systems   Skin:  Positive for color change, rash and wound.   All other systems reviewed and are negative.    Objective:     Vital Signs (Most Recent):  Temp: 98.7 °F (37.1 °C) (08/15/23 1628)  Pulse: 107 (08/15/23 1628)  Resp: 18 (08/15/23 1857)  BP: 138/77 (08/15/23 1628)  SpO2: 98 % (08/15/23 1628) Vital Signs (24h Range):  Temp:  [97.8 °F (36.6 °C)-98.7 °F (37.1 °C)] 98.7 °F (37.1 °C)  Pulse:  [] 107  Resp:  [16-18] 18  SpO2:  [98 %-100 %] 98 %  BP: (123-138)/(70-80) 138/77     Weight: 66.7 kg (147 lb 0.8 oz)  Body mass index is 22.36 kg/m².    Estimated Creatinine Clearance: 110.1 mL/min (based on SCr of 0.9 mg/dL).     Physical Exam  Vitals reviewed.   Constitutional:       General: He is not in acute distress.     Appearance: Normal appearance. He is not ill-appearing.   HENT:      Head: Normocephalic and atraumatic.   Eyes:      Extraocular Movements: Extraocular movements intact.      Conjunctiva/sclera: Conjunctivae normal.   Cardiovascular:      Rate and Rhythm: Normal rate and regular rhythm.      Heart sounds: No murmur heard.  Pulmonary:      Effort: Pulmonary effort is normal. No respiratory distress.      Breath sounds: Normal breath sounds.   Abdominal:      General: Abdomen is flat. Bowel sounds are normal.      Palpations: Abdomen is soft.      Tenderness: There is no abdominal tenderness.   Genitourinary:     Comments: Small flesh colored lesions in L groin and base of penis  Musculoskeletal:      Cervical back: Normal range of motion.      Comments: Numerous flesh colored lesions in R underarm, tender to palpation, no active drainage   Skin:     General: Skin is warm and dry.   Neurological:      General: No focal deficit present.      Mental Status: He is alert and oriented to person, place, and time.   Psychiatric:         Mood and Affect: Mood normal.         Behavior: Behavior normal.         Thought Content: Thought content normal.           Significant Labs:   Microbiology Results (last 7 days)       Procedure Component Value Units Date/Time    Aerobic culture [[id number]]  (Abnormal)  (Susceptibility) Collected: 08/12/23 1616    Order Status: Completed Specimen: Biopsy from Chest, Right Updated: 08/15/23 1013     Aerobic Bacterial Culture METHICILLIN RESISTANT STAPHYLOCOCCUS AUREUS  Rare  No other significant isolate      AFB Culture & Smear [[id number]] Collected: 08/12/23 1616    Order Status: Completed Specimen: Biopsy from Chest, Right Updated: 08/14/23 1618     AFB Culture & Smear Culture in progress     AFB CULTURE STAIN No acid fast bacilli seen.    Culture, Anaerobe [[id number]] Collected: 08/12/23 1616    Order Status: Completed Specimen: Biopsy from Chest, Right Updated: 08/14/23 0457     Anaerobic Culture Culture in progress    Fungus culture [[id number]] Collected: 08/12/23 1616    Order Status: Sent Specimen: Biopsy from Chest, Right Updated: 08/12/23 1654    C. trachomatis/N. gonorrhoeae by AMP DNA [facility]; Urine [[id number]]  (Abnormal) Collected: 08/11/23 1422    Order Status: Completed Specimen: Genital Updated: 08/12/23 1622     Chlamydia, Amplified DNA Not Detected     N gonorrhoeae, amplified DNA Detected    Narrative:      Sources by Resulting Lab:->[facility]  Release to patient->Immediate    C. trachomatis/N. gonorrhoeae by AMP DNA [facility]; Urethra [[id number]] Collected: 08/11/23 1413    Order Status: Sent Specimen: Genital Updated: 08/11/23 1413            Significant Imaging: I have reviewed all pertinent imaging results/findings within the past 24 hours.

## 2023-08-16 NOTE — PLAN OF CARE
Problem: Adult Inpatient Plan of Care  Goal: Plan of Care Review  Outcome: Ongoing, Progressing  Goal: Patient-Specific Goal (Individualized)  Outcome: Ongoing, Progressing  Goal: Absence of Hospital-Acquired Illness or Injury  Outcome: Ongoing, Progressing  Goal: Optimal Comfort and Wellbeing  Outcome: Ongoing, Progressing  Goal: Readiness for Transition of Care  Outcome: Ongoing, Progressing     Problem: Infection  Goal: Absence of Infection Signs and Symptoms  Outcome: Ongoing, Progressing     Problem: Pain Acute  Goal: Acceptable Pain Control and Functional Ability  Outcome: Ongoing, Progressing     Problem: Skin or Soft Tissue Infection  Goal: Absence of Infection Signs and Symptoms  Outcome: Ongoing, Progressing     Problem: Fall Injury Risk  Goal: Absence of Fall and Fall-Related Injury  Outcome: Ongoing, Progressing

## 2023-08-16 NOTE — DISCHARGE SUMMARY
"[facility]ation [address] [place] [address] Medicine  Discharge Summary      Patient Name: Victorino Fisher  MRN: [id number]  JEFFREY: [id number]  Patient Class: IP- Inpatient  Admission Date: 8/11/2023  Hospital Length of Stay: 2 days  Discharge Date and Time:  08/16/2023 1:05 PM  Attending Physician: Gwyn Mora MD   Discharging Provider: Gwyn Mora MD  Primary Care Provider: [place], Primary Doctor  [facility]  Gwyn Mora MD  Primary Care Team: [facility] MED     HPI:   Victorino Fisher is a 32yo M with a PMH of HIV (not on HAART) and tobacco abuse who presented to the [facility] ED on 8/11/23 with complaints of scrotal pain and pain in his right axilla. Pt reports an "ingrown hair" in his right armpit 3 weeks prior, which has allegedly enlarged into a mass over this short time pain; associated with occasional purulent discharge and pain with ROM. States he has not thought much of it. Also with 2-3 day history of BL testicular pain and scrotal swelling. Denies F/C/N/V/D/C, HA, SOB, CP, palpitations, abdominal pain, dysuria, extremity swelling, or symptoms otherwise.    In the ED, VSS. Labs remarkable for Hb 10.8, though were otherwise grossly asymptomatic. Scrotal US showed sonographic findings suggestive of bilateral epididymitis with scrotal wall thickening, bilateral groin masses measuring up to 3.4 cm which likely represent pathologic lymphadenopathy, bilateral epididymal cysts, punctate left testicular cystic focus too small to characterize, and bilateral varicoceles. ED gave doxy, rocephin, and vanc and consulted ID and derm. Hospital medicine was consulted for admission and further management.      * No surgery found *      Hospital Course:   Pt admitted to [facility] and started on empiric vanc and zosyn; doxy added per ID recs. Received IM rocephin in the ED for gonorrhea. Dermatology consulted for biopsy of right axillary lesion; path pending. RPR reactive, consistent with active syphilis, on " doxy per ID. ENT consulted for oral lesion, biopsied on 8/14. Wound culture of right axillary mass growing S.aureus as a superimposed infection. Started on HAART by ID on 8/15. Plastics and gen surg were consulted for surgical removal of right axillary mass, though deferred inpatient intervention. Pt deemed appropriate for discharge to complete outpatient course of doxy for S.aureus cellulitis and syphilis. SW/CM enlisted for assistance in records being sent and appointments established at [facility]. Plan discussed with pt, who was agreeable and amenable; medications were discussed and reviewed, outpatient follow-up scheduled, ER precautions were given, all questions were answered to the pt's satisfaction, and Mr. Fisher was subsequently discharged.         Goals of Care Treatment Preferences:  Code Status: Full Code      Consults:   Consults (From admission, onward)        Status Ordering Provider     Inpatient consult to General Surgery  Once        Provider:  (Not yet assigned)    Acknowledged KARLA WATTS     Inpatient consult to ENT  Once        Provider:  (Not yet assigned)    Completed KARLA WATTS     Pharmacy to dose Vancomycin consult  Once        Provider:  (Not yet assigned)   See [facility] for full Linked Orders Report.    Acknowledged KARLA WATTS     Inpatient consult to Dermatology  Once        Provider:  (Not yet assigned)    Completed ABIDA OBREGON     Inpatient consult to Infectious Diseases  Once        Provider:  (Not yet assigned)    Completed ABIDA OBREGON          No new Assessment & Plan notes have been filed under this hospital service since the last note was generated.  Service: Hospital Medicine    Final Active Diagnoses:    Diagnosis Date Noted POA    PRINCIPAL PROBLEM:  Bilateral epididymitis [N45.1] 08/11/2023 Yes    Mass of right axilla [R22.31] 08/11/2023 Yes    Rash of groin [R21] 08/11/2023 Yes    Diffuse lymphadenopathy [R59.1] 08/11/2023 Yes    HIV (human  immunodeficiency virus infection) [B20] 08/11/2023 Yes    Syphilis [A53.9] 08/12/2023 Yes    Mouth lesion [K13.70] 08/12/2023 Yes    Thrombocytopenia [D69.6] 08/14/2023 Yes    Normocytic anemia [D64.9] 08/11/2023 Yes    Tobacco abuse [Z72.0] 08/11/2023 Yes    Tobacco abuse counseling [Z71.6] 08/11/2023 Not Applicable      Problems Resolved During this Admission:       Discharged Condition: stable    Disposition: Home or Self Care    Follow Up:   Follow-up Information     [facility] Go on 8/19/2023.    Why: Hospital Follow Up with Dr. Aquiles Rojo at 12:00PM; Bring Discharge Summary, ID, Insurance Card, and Medication List. Arrive 30 minutes prior to appointment in order to complete paperwork  Contact information:  [address] [facility] [id number] [phone]             [facility]. Call.    Why: Release of Information in order to obtain a copy of your medical records.  Contact information:  [phone]           [facility]. Schedule an appointment as soon as possible for a visit.    Specialties: Internal Medicine, Family Medicine  Why: Contact office to establish care at their facility.  Contact information:  [address] [address]  [phone]             Disease/[facility] - Infectious Follow up.    Specialty: Infectious Diseases  Why: SW fax ambulatory referral to [facility] for nurse to call and schedule follow up appointment; however, if you do not hear from someone within 48 hours contact the number listed.  Contact information:  [address] [facility] [address]  [phone]             [facility]    Specialties: Allergy, Dermatology  Why: SW fax ambulatory referral to [facility] for nurse to call and schedule follow up appointment; however, if you do not hear from someone within 48 hours contact the number listed.  Contact information:  [address] [facility]  [facility] [address]  [phone]             Surgery, [facility] - Trauma/General Follow up.    Specialties: Trauma Surgery, General Surgery, Surgery  Why: SW fax ambulatory referral to [facility] for nurse to call and schedule follow up appointment; however, if you do not hear from someone within 48 hours contact the number listed.  Contact information:  [address] [facility] [address]  [phone]                       Patient Instructions:      Ambulatory referral/consult to Dermatology   Standing Status: Future   Referral Priority: Routine Referral Type: Consultation   Referral Reason: Specialty Services Required   Requested Specialty: Dermatology   Number of Visits Requested: 1     Ambulatory referral/consult to Infectious Disease   Standing Status: Future   Referral Priority: Routine Referral Type: Consultation   Referral Reason: Specialty Services Required   Requested Specialty: Infectious Diseases   Number of Visits Requested: 1     Ambulatory referral/consult to Internal Medicine   Standing Status: Future   Referral Priority: Routine Referral Type: Consultation   Referral Reason: Specialty Services Required   Requested Specialty: Internal Medicine   Number of Visits Requested: 1     Ambulatory referral/consult to General Surgery   Standing Status: Future   Referral Priority: Routine Referral Type: Consultation   Referral Reason: Specialty Services Required   Requested Specialty: General Surgery   Number of Visits Requested: 1     Diet Adult Regular     Notify your health care provider if you experience any of the following:  increased confusion or weakness     Notify your health care provider if you experience any of the following:  persistent dizziness, light-headedness, or visual disturbances     Notify your health care provider if you experience any of the following:  worsening rash     Notify your health care provider if you experience any of the following:  severe persistent headache      Notify your health care provider if you experience any of the following:  difficulty breathing or increased cough     Notify your health care provider if you experience any of the following:  severe uncontrolled pain     Notify your health care provider if you experience any of the following:  persistent nausea and vomiting or diarrhea     Notify your health care provider if you experience any of the following:  temperature >100.4     Activity as tolerated       Significant Diagnostic Studies: Labs: All labs within the past 24 hours have been reviewed    Pending Diagnostic Studies:     Procedure Component Value Units Date/Time    HIV-1 GenotypR PLUS [[id number]] Collected: 08/14/23 0749    Order Status: Sent Lab Status: In process Updated: 08/14/23 0814    Specimen: Blood     HIV-1 INTEGRASE GENOTYPE [[id number]] Collected: 08/14/23 0749    Order Status: Sent Lab Status: In process Updated: 08/14/23 0814    Specimen: Blood     Specimen to Pathology, Surgery Dermatology and skin neoplasms [[id number]] Collected: 08/12/23 1615    Order Status: Sent Lab Status: In process Updated: 08/14/23 0757    Specimen: Tissue     Specimen to Pathology, Surgery ENT [[id number]] Collected: 08/14/23 0950    Order Status: Sent Lab Status: In process Updated: 08/14/23 1510    Specimen: Tissue          Medications:  Reconciled Home Medications:      Medication List      START taking these medications    hikpscxdd-ncfrtqwn-cpdrujn ala -25 mg (25 kg or greater)  Commonly known as: BIKTARVY  Take 1 tablet by mouth once daily.     doxycycline 100 MG tablet  Commonly known as: VIBRA-TABS  Take 1 tablet (100 mg total) by mouth every 12 (twelve) hours.     oxyCODONE-acetaminophen  mg per tablet  Commonly known as: PERCOCET  Take 1 tablet by mouth every 6 (six) hours as needed for Pain.            Indwelling Lines/Drains at time of discharge:   Lines/Drains/Airways     None                 Time spent on the discharge of patient: 35  minutes         Gwyn Mora MD  Attending Physician  Medical Director - [facility] Observation Unit  Department of Hospital Medicine  8/16/2023

## 2023-08-16 NOTE — ASSESSMENT & PLAN NOTE
CD4 count is 523.  Viral load is 46,874.    Plan    1. Patient started on biktarvy.    2. Will arrange follow up in ID clinic.

## 2023-08-16 NOTE — TELEPHONE ENCOUNTER
Nivia, this is Silvino Johnson, clinical pharmacist with [facility] that is part of your care team.  We have begun working on your prescription that your doctor has sent us. Our next steps include:     Working with your insurance company to obtain approval for your medication  Working with you to ensure your medication is affordable     We will be calling you along the way with updates on your medication but if you have any concerns or receive information that you would like to discuss please reach us at [phone].    Welcome call outcome: Patient/caregiver reached

## 2023-08-17 LAB — BACTERIA SPEC ANAEROBE CULT: NORMAL

## 2023-08-18 LAB
FINAL PATHOLOGIC DIAGNOSIS: NORMAL
GROSS: NORMAL
Lab: NORMAL
MICROSCOPIC EXAM: NORMAL

## 2023-08-22 LAB — HIV GENTYP ISLT: DETECTED

## 2023-08-22 NOTE — TELEPHONE ENCOUNTER
Outgoing call regarding Pickup reminder. Pt needed to change the pickup to delivery. Pt was due to  on 8/16/23. This was changed to Delivery for 8/22/23.

## 2023-08-23 ENCOUNTER — TELEPHONE (OUTPATIENT)
Dept: DERMATOLOGY | Facility: CLINIC | Age: 33
End: 2023-08-23
Payer: MEDICAID

## 2023-08-23 LAB
COMMENT: NORMAL
FINAL PATHOLOGIC DIAGNOSIS: NORMAL
GROSS: NORMAL
Lab: NORMAL
MICROSCOPIC EXAM: NORMAL

## 2023-08-28 ENCOUNTER — TELEPHONE (OUTPATIENT)
Dept: DERMATOLOGY | Facility: CLINIC | Age: 33
End: 2023-08-28
Payer: MEDICAID

## 2023-08-28 DIAGNOSIS — C46.9 KAPOSI SARCOMA: Primary | ICD-10-CM

## 2023-08-28 NOTE — TELEPHONE ENCOUNTER
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 He reports he is currently at [facility] for draining KS lesions in his groin.    I discussed etiology and importance of continuing ART medication and following up with ID. I will place referral for Oncology for KS staging and treatment urgently given rapidly progressive nature of tumors over the last 2mo.     Final Pathologic Diagnosis 1. Skin, right axilla, shave biopsy:   - KAPOSI'S SARCOMA.    Comment: Interp By Oni Vyas M.D., Signed on 08/18/2023 at 11:47   Microscopic Exam Sections show a confluent nodular tumor within the dermis composed atypical vascular channels interwoven among densely packed atypical spindle cells.  Extensive erythrocyte extravasation is also noted.  Multiple mitoses are noted within the tumor.  The   tumor stains positive for HHV 8, CD 31, and CD 34 immunohistochemical stains.  The tumor stains negative for pancytokeratin (AE1/AE3) immunohistochemical stain.  Appropriately reactive controls were reviewed.

## 2023-09-11 LAB — FUNGUS SPEC CULT: NORMAL

## 2023-09-13 ENCOUNTER — TELEPHONE (OUTPATIENT)
Dept: INFECTIOUS DISEASES | Facility: CLINIC | Age: 33
End: 2023-09-13
Payer: MEDICAID

## 2023-09-13 LAB
BICTEGRAVIR ISLT GENOTYP: NORMAL
CABOTEGRAVIR ISLT GENOTYP: NORMAL
COLLECT DATE: NORMAL
DOLUTEGRAVIR ISLT GENOTYP: NORMAL
ELVITEGRAVIR ISLT GENOTYP: NORMAL
RALTEGRAVIR ISLT GENOTYP: NORMAL
VALUE OF LAST VIRAL LOAD: NORMAL COPIES/ML

## 2023-09-13 NOTE — TELEPHONE ENCOUNTER
----- Message from Gracie Meyer MA sent at 9/13/2023 10:16 AM CDT -----  Pt is asking for a call from you.

## 2023-09-13 NOTE — TELEPHONE ENCOUNTER
Attempted to call patient multiple times.  Unable to reach the patient.  Unable to leave voicemail.

## 2023-09-21 ENCOUNTER — TELEPHONE (OUTPATIENT)
Dept: INFECTIOUS DISEASES | Facility: CLINIC | Age: 33
End: 2023-09-21
Payer: MEDICAID

## 2023-09-30 LAB
ACID FAST MOD KINY STN SPEC: NORMAL
MYCOBACTERIUM SPEC QL CULT: NORMAL

## 2023-10-04 ENCOUNTER — TELEPHONE (OUTPATIENT)
Dept: INFECTIOUS DISEASES | Facility: CLINIC | Age: 33
End: 2023-10-04
Payer: MEDICAID

## 2023-10-04 NOTE — TELEPHONE ENCOUNTER
----- Message from Bryan Schafer MD sent at 10/3/2023  5:12 PM CDT -----  Notify patient that pharmacy is trying to reach him.    Ayush Schafer    ----- Message -----  From: Silvino Johnson, PharmD  Sent: 10/3/2023   2:09 PM CDT  To: Bryan Schafer MD
